# Patient Record
Sex: MALE | Race: WHITE | Employment: FULL TIME | URBAN - METROPOLITAN AREA
[De-identification: names, ages, dates, MRNs, and addresses within clinical notes are randomized per-mention and may not be internally consistent; named-entity substitution may affect disease eponyms.]

---

## 2017-01-03 ENCOUNTER — HOSPITAL ENCOUNTER (OUTPATIENT)
Dept: GENERAL RADIOLOGY | Age: 32
Discharge: HOME | End: 2017-01-03
Payer: COMMERCIAL

## 2017-01-03 DIAGNOSIS — M47.819 SPINAL ARTHRITIS DEFORMANS: ICD-10-CM

## 2017-01-03 DIAGNOSIS — Z79.899 ENCOUNTER FOR LONG-TERM (CURRENT) DRUG USE: ICD-10-CM

## 2017-01-03 PROCEDURE — 72110 X-RAY EXAM L-2 SPINE 4/>VWS: CPT

## 2017-01-03 PROCEDURE — 73560 X-RAY EXAM OF KNEE 1 OR 2: CPT

## 2017-01-03 PROCEDURE — 71020 XR CHEST PA LAT: CPT

## 2017-01-16 ENCOUNTER — HOSPITAL ENCOUNTER (EMERGENCY)
Age: 32
Discharge: HOME | End: 2017-01-16
Attending: EMERGENCY MEDICINE | Admitting: EMERGENCY MEDICINE
Payer: COMMERCIAL

## 2017-01-16 VITALS
DIASTOLIC BLOOD PRESSURE: 91 MMHG | TEMPERATURE: 97.8 F | OXYGEN SATURATION: 99 % | WEIGHT: 185 LBS | SYSTOLIC BLOOD PRESSURE: 138 MMHG | RESPIRATION RATE: 16 BRPM | BODY MASS INDEX: 25.06 KG/M2 | HEART RATE: 83 BPM | HEIGHT: 72 IN

## 2017-01-16 DIAGNOSIS — M54.42 CHRONIC LEFT-SIDED LOW BACK PAIN WITH LEFT-SIDED SCIATICA: Primary | ICD-10-CM

## 2017-01-16 DIAGNOSIS — G89.29 CHRONIC LEFT-SIDED LOW BACK PAIN WITH LEFT-SIDED SCIATICA: Primary | ICD-10-CM

## 2017-01-16 PROCEDURE — 99282 EMERGENCY DEPT VISIT SF MDM: CPT

## 2017-01-16 RX ORDER — OXYCODONE AND ACETAMINOPHEN 5; 325 MG/1; MG/1
1-2 TABLET ORAL
Qty: 20 TAB | Refills: 0 | Status: ON HOLD | OUTPATIENT
Start: 2017-01-16 | End: 2017-02-28

## 2017-01-16 RX ORDER — CYCLOBENZAPRINE HCL 5 MG
5 TABLET ORAL
Qty: 15 TAB | Refills: 0 | Status: SHIPPED | OUTPATIENT
Start: 2017-01-16

## 2017-01-16 RX ORDER — NAPROXEN 500 MG/1
500 TABLET ORAL 2 TIMES DAILY WITH MEALS
Status: ON HOLD | COMMUNITY
End: 2017-04-18

## 2017-01-17 NOTE — ED NOTES
I have reviewed discharge instructions with the patient. The patient verbalized understanding. Patient armband removed and shredded  Current Discharge Medication List      START taking these medications    Details   oxyCODONE-acetaminophen (PERCOCET) 5-325 mg per tablet Take 1-2 Tabs by mouth every six (6) hours as needed for Pain. Max Daily Amount: 8 Tabs. Qty: 20 Tab, Refills: 0      cyclobenzaprine (FLEXERIL) 5 mg tablet Take 1 Tab by mouth three (3) times daily as needed for Muscle Spasm(s). Qty: 15 Tab, Refills: 0         CONTINUE these medications which have NOT CHANGED    Details   naproxen (NAPROSYN) 500 mg tablet Take 500 mg by mouth two (2) times daily (with meals). CETIRIZINE HCL (ZYRTEC PO) Take  by mouth.

## 2017-01-17 NOTE — DISCHARGE INSTRUCTIONS
Learning About How to Have a Healthy Back  What causes back pain? Back pain is often caused by overuse, strain, or injury. For example, people often hurt their backs playing sports or working in the yard, being jolted in a car accident, or lifting something too heavy. Aging plays a part too. Your bones and muscles tend to lose strength as you age, which makes injury more likely. The spongy discs between the bones of the spine (vertebrae) may suffer from wear and tear and no longer provide enough cushion between the bones. A disc that bulges or breaks open (herniated disc) can press on nerves, causing back pain. In some people, back pain is the result of arthritis, broken vertebrae caused by bone loss (osteoporosis), illness, or a spine problem. Although most people have back pain at one time or another, there are steps you can take to make it less likely. How can you have a healthy back? Reduce stress on your back through good posture  Slumping or slouching alone may not cause low back pain. But after the back has been strained or injured, bad posture can make pain worse. · Sleep in a position that maintains your back's normal curves and on a mattress that feels comfortable. Sleep on your side with a pillow between your knees, or sleep on your back with a pillow under your knees. These positions can reduce strain on your back. · Stand and sit up straight. \"Good posture\" generally means your ears, shoulders, and hips are in a straight line. · If you must stand for a long time, put one foot on a stool, ledge, or box. Switch feet every now and then. · Sit in a chair that is low enough to let you place both feet flat on the floor with both knees nearly level with your hips. If your chair or desk is too high, use a footrest to raise your knees. Place a small pillow, a rolled-up towel, or a lumbar roll in the curve of your back if you need extra support.   · Try a kneeling chair, which helps tilt your hips forward. This takes pressure off your lower back. · Try sitting on an exercise ball. It can rock from side to side, which helps keep your back loose. · When driving, keep your knees nearly level with your hips. Sit straight, and drive with both hands on the steering wheel. Your arms should be in a slightly bent position. Reduce stress on your back through careful lifting  · Squat down, bending at the hips and knees only. If you need to, put one knee to the floor and extend your other knee in front of you, bent at a right angle (half kneeling). · Press your chest straight forward. This helps keep your upper back straight while keeping a slight arch in your low back. · Hold the load as close to your body as possible, at the level of your belly button (navel). · Use your feet to change direction, taking small steps. · Lead with your hips as you change direction. Keep your shoulders in line with your hips as you move. · Set down your load carefully, squatting with your knees and hips only. Exercise and stretch your back  · Do some exercise on most days of the week, if your doctor says it is okay. You can walk, run, swim, or cycle. · Stretch your back muscles. Here are a few exercises to try:  Aracely Annette on your back, and gently pull one bent knee to your chest. Put that foot back on the floor, and then pull the other knee to your chest.  ¨ Do pelvic tilts. Lie on your back with your knees bent. Tighten your stomach muscles. Pull your belly button (navel) in and up toward your ribs. You should feel like your back is pressing to the floor and your hips and pelvis are slightly lifting off the floor. Hold for 6 seconds while breathing smoothly. ¨ Sit with your back flat against a wall. · Keep your core muscles strong. The muscles of your back, belly (abdomen), and buttocks support your spine. ¨ Pull in your belly and imagine pulling your navel toward your spine. Hold this for 6 seconds, then relax.  Remember to keep breathing normally as you tense your muscles. ¨ Do curl-ups. Always do them with your knees bent. Keep your low back on the floor, and curl your shoulders toward your knees using a smooth, slow motion. Keep your arms folded across your chest. If this bothers your neck, try putting your hands behind your neck (not your head), with your elbows spread apart. ¨ Lie on your back with your knees bent and your feet flat on the floor. Tighten your belly muscles, and then push with your feet and raise your buttocks up a few inches. Hold this position 6 seconds as you continue to breathe normally, then lower yourself slowly to the floor. Repeat 8 to 12 times. ¨ If you like group exercise, try Pilates or yoga. These classes have poses that strengthen the core muscles. Lead a healthy lifestyle  · Stay at a healthy weight to avoid strain on your back. · Do not smoke. Smoking increases the risk of osteoporosis, which weakens the spine. If you need help quitting, talk to your doctor about stop-smoking programs and medicines. These can increase your chances of quitting for good. Where can you learn more? Go to http://viviana-leida.info/. Enter L315 in the search box to learn more about \"Learning About How to Have a Healthy Back. \"  Current as of: May 23, 2016  Content Version: 11.1  © 8531-6958 [employer]. Care instructions adapted under license by [employer] (which disclaims liability or warranty for this information). If you have questions about a medical condition or this instruction, always ask your healthcare professional. [place] [address] any warranty or liability for your use of this information.

## 2017-01-17 NOTE — ED PROVIDER NOTES
HPI Comments: Patient is a 31 y/o male who presents to the ER c/o lower back pain radiating to his left leg. Patient states he was diagnosed with ankylosing spondylitis and follows up with a rhuematologist.  They advised the pt to come to the ER if he has symptoms to receive an MRI. Patient has already been taking Naproxen 500mg q 12 hours and he just finished a 2 week steroid dose. Patient stated no change in his pain after the steroids. He denied any saddle anesthesia, bowel or bladder incontinence or new symptoms. States he just has difficulty sleeping due to the pain. Patient is a 32 y.o. male presenting with leg pain. The history is provided by the patient. Leg Pain    This is a chronic problem. Associated symptoms include back pain. Past Medical History:   Diagnosis Date    Asthma     Spondylitis [facility])        Past Surgical History:   Procedure Laterality Date    Hx tonsil and adenoidectomy           History reviewed. No pertinent family history. Social History     Social History    Marital status: SINGLE     Spouse name: N/A    Number of children: N/A    Years of education: N/A     Occupational History    Not on file. Social History Main Topics    Smoking status: Former Smoker    Smokeless tobacco: Not on file    Alcohol use Yes      Comment: 6-7 drinks per week    Drug use: No    Sexual activity: Not on file     Other Topics Concern    Not on file     Social History Narrative    No narrative on file         ALLERGIES: Review of patient's allergies indicates no known allergies. Review of Systems   Constitutional: Negative for chills, fatigue and fever. HENT: Negative. Negative for sore throat. Eyes: Negative. Respiratory: Negative for cough and shortness of breath. Cardiovascular: Negative for chest pain and palpitations. Gastrointestinal: Negative for abdominal pain, nausea and vomiting. Genitourinary: Negative for dysuria.    Musculoskeletal: Positive for back pain. Left leg pain   Skin: Negative. Neurological: Negative for dizziness, weakness, light-headedness and headaches. Psychiatric/Behavioral: Negative. All other systems reviewed and are negative. Vitals:    01/16/17 1959   BP: (!) 138/91   Pulse: 83   Resp: 16   Temp: 97.8 °F (36.6 °C)   SpO2: 99%   Weight: 83.9 kg (185 lb)   Height: 6' (1.829 m)            Physical Exam   Constitutional: He is oriented to person, place, and time. He appears well-developed and well-nourished. HENT:   Head: Normocephalic and atraumatic. Mouth/Throat: Oropharynx is clear and moist.   Eyes: Conjunctivae are normal. Pupils are equal, round, and reactive to light. No scleral icterus. Neck: Normal range of motion. Neck supple. No JVD present. No tracheal deviation present. Cardiovascular: Normal rate, regular rhythm and normal heart sounds. Pulmonary/Chest: Effort normal and breath sounds normal. No respiratory distress. He has no wheezes. Abdominal: Soft. Bowel sounds are normal.   Musculoskeletal:        Lumbar back: He exhibits tenderness. Back:    Pain in lower back that radiates to left posterior leg   Neurological: He is alert and oriented to person, place, and time. He has normal strength. Gait normal. GCS eye subscore is 4. GCS verbal subscore is 5. GCS motor subscore is 6. Walks with steady gait   Skin: Skin is warm and dry. Psychiatric: He has a normal mood and affect. Nursing note and vitals reviewed. MDM  Number of Diagnoses or Management Options  Chronic left-sided low back pain with left-sided sciatica:   Elevated blood pressure:   Diagnosis management comments: 8:51 PM  33 y/o male c/o chronic low back pain; states next appt is at end of February. Has tried steroids and naproxen with little relief. Advised unable to obtain MRI here in ER. Will need to obtain as outpatient. Will also give new ortho referral and try meds.   All questions answered and patient in agreement with plan of care. Will plan for discharge. Based on complaint and PE, no clinical indication for further imaging at this time.   Alexander Sloan PA-C    Clinical Impression:  Low back pain w/ sciatica    Risk of Complications, Morbidity, and/or Mortality  Presenting problems: low  Diagnostic procedures: low  Management options: low    Critical Care  Total time providing critical care: < 30 minutes    Patient Progress  Patient progress: stable    ED Course       Procedures

## 2017-01-17 NOTE — ED TRIAGE NOTES
C/O pain to left leg \"for months\".  Pt states that he was recently put on steroids by rheumatologist.

## 2017-01-25 ENCOUNTER — HOSPITAL ENCOUNTER (OUTPATIENT)
Age: 32
Discharge: HOME | End: 2017-01-25
Attending: INTERNAL MEDICINE
Payer: COMMERCIAL

## 2017-01-25 DIAGNOSIS — M54.50 LOW BACK PAIN: ICD-10-CM

## 2017-01-25 PROCEDURE — 72148 MRI LUMBAR SPINE W/O DYE: CPT

## 2017-02-15 ENCOUNTER — OFFICE VISIT (OUTPATIENT)
Dept: ORTHOPEDIC SURGERY | Age: 32
End: 2017-02-15

## 2017-02-15 VITALS
OXYGEN SATURATION: 98 % | RESPIRATION RATE: 18 BRPM | SYSTOLIC BLOOD PRESSURE: 124 MMHG | HEART RATE: 82 BPM | TEMPERATURE: 98.4 F | DIASTOLIC BLOOD PRESSURE: 68 MMHG | BODY MASS INDEX: 24.38 KG/M2 | WEIGHT: 180 LBS | HEIGHT: 72 IN

## 2017-02-15 DIAGNOSIS — M51.26 LUMBAR HERNIATED DISC: ICD-10-CM

## 2017-02-15 DIAGNOSIS — M54.16 LUMBAR RADICULOPATHY: Primary | ICD-10-CM

## 2017-02-15 NOTE — PROGRESS NOTES
[address]  [address], [address]  [place], [address] [place] [address] Street  Phone: [phone]  Fax: [phone]        Walter Vega  : 1985  PCP: Milana Enriquez MD  2/15/2017    NEW PATIENT      ASSESSMENT AND PLAN     Amalia Ruelas comes in to the office today c/o 6-10/10 aching lumbar pain that radiates down the posterior left leg to the top of his foot x 4 months. He has had a lumbar MRI which shows a 1.0 cm posterior L5-S1 disc protrusion with abutment of the bilateral S1 nerve roots and potentially other nerve roots. His pain is likely due to a left L5 and S1 radiculopathy. He was referred for left L5 and S1 SNRBs. 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 Pt will f/u in 3 weeks or prn. Heydi Gonsalves was seen today for back pain, leg pain and hip pain. Diagnoses and all orders for this visit:    Lumbar radiculopathy  -     SCHEDULE SURGERY  -     Generic Supply Order    Lumbar herniated disc  -     SCHEDULE SURGERY  -     Generic Supply Order         Follow-up Disposition:  Return in about 3 weeks (around 3/8/2017), or if symptoms worsen or fail to improve. CHIEF COMPLAINT  Amalia Ruelas is seen today in consultation at the request of Milana Enriquez MD for complaints of lumbar pain. HISTORY OF PRESENT ILLNESS  Amalia Ruelas is a 32 y.o. male c/o 6-10/10 aching lumbar pain that radiates down the posterior left leg to the top of his foot x 4 months. He does not recall an incident which could have caused his pain. He has taken Naproxen, Tylenol, Ibuprofen, Gabapentin, Endomethein, and Oxycodone for his pain. Sitting exacerbates his pain. Lying down relieves his pain. Pt denies any fevers, chills, nausea, vomiting. Pt denies any chest pain and shortness of breath. Pt denies any ear, nose, and throat problems. Pt denies any fecal or urinary incontinence. He works in heavy construction.       PAST MEDICAL HISTORY   Past Medical History   Diagnosis Date    Asthma     Spondylitis [facility])        Past Surgical History   Procedure Laterality Date    Hx tonsil and adenoidectomy         MEDICATIONS      Current Outpatient Prescriptions   Medication Sig Dispense Refill    CETIRIZINE HCL (ZYRTEC PO) Take  by mouth.  cyclobenzaprine (FLEXERIL) 5 mg tablet Take 1 Tab by mouth three (3) times daily as needed for Muscle Spasm(s). 15 Tab 0    naproxen (NAPROSYN) 500 mg tablet Take 500 mg by mouth two (2) times daily (with meals).  oxyCODONE-acetaminophen (PERCOCET) 5-325 mg per tablet Take 1-2 Tabs by mouth every six (6) hours as needed for Pain. Max Daily Amount: 8 Tabs. 20 Tab 0       ALLERGIES  No Known Allergies       SOCIAL HISTORY    Social History     Social History    Marital status: SINGLE     Spouse name: N/A    Number of children: N/A    Years of education: N/A     Social History Main Topics    Smoking status: Former Smoker    Smokeless tobacco: None    Alcohol use Yes      Comment: 6-7 drinks per week    Drug use: No    Sexual activity: Not Asked     Other Topics Concern    None     Social History Narrative       FAMILY HISTORY  No family history on file. REVIEW OF SYSTEMS  Review of Systems   Constitutional: Negative for chills, diaphoresis, fever, malaise/fatigue and weight loss. Respiratory: Negative for shortness of breath. Cardiovascular: Negative for chest pain and leg swelling. Gastrointestinal: Negative for constipation, nausea and vomiting. Neurological: Negative for dizziness, tingling, seizures, loss of consciousness and headaches. Psychiatric/Behavioral: The patient does not have insomnia. PHYSICAL EXAMINATION  Visit Vitals    /68    Pulse 82    Temp 98.4 °F (36.9 °C) (Oral)    Resp 18    Ht 6' (1.829 m)    Wt 180 lb (81.6 kg)    SpO2 98%    BMI 24.41 kg/m2         No flowsheet data found.       Constitutional:  Well developed, well nourished, in no acute distress. Psychiatric: Affect and mood are appropriate. HEENT: Normocephalic, atraumatic. Extraocular movements intact. Integumentary: No rashes or abrasions noted on exposed areas. Cardiovascular: Regular rate and rhythm. Pulmonary: Clear to auscultation bilaterally. SPINE/MUSCULOSKELETAL EXAM    Cervical spine:  Neck is midline. Normal muscle tone. No focal atrophy is noted. ROM pain free. Shoulder ROM intact. No tenderness to palpation. Negative Spurling's sign. Negative Tinel's sign. Negative Stanley's sign. Sensation in the bilateral arms grossly intact to light touch. Lumbar spine:  No rash, ecchymosis, or gross obliquity. No fasciculations. No focal atrophy is noted. No pain with hip ROM. Full range of motion. No tenderness to palpation. No tenderness to palpation at the sciatic notch. SI joints non-tender. Trochanters non tender. No pain with back flexion or extension. Decreased sensation in left L5 dermatome. MOTOR:      Biceps  Triceps Deltoids Wrist Ext Wrist Flex Hand Intrin   Right 5/5 5/5 5/5 5/5 5/5 5/5   Left 5/5 5/5 5/5 5/5 5/5 5/5             Hip Flex  Quads Hamstrings Ankle DF EHL Ankle PF   Right 5/5 5/5 5/5 5/5 5/5 5/5   Left 5/5 5/5 5/5 5/5 5/5 5/5     DTRs are 2+ biceps, triceps, brachioradialis, patella, and Achilles. Positive left straight Leg raise. Squat not tested. No difficulty with tandem gait. Ambulation without assistive device. FWB. RADIOGRAPHS  Lumbar MRI images taken on 1/25/2017 personally reviewed with patient:  FINDINGS:     General: Vertebral body heights preserved. L5-S1 mild disc space loss. Elsewhere  disc space heights preserved. No focal listhesis. Mild straightening of the  normal lordosis. Conus ends at L1 level. No abnormal signal in the distal cord. No abnormal epidural collection identified.  No focal suspicious marrow lesions.     Incidental: Surrounding soft tissues unremarkable.     Levels:     T11-T12: Evaluated sagittal plane only. No significant degenerative change or  stenosis.     T12-L1: No significant degenerative changes or stenosis.     L1-L2: Minimal disc bulge. Facets unremarkable. No stenosis.     L2-L3: Minimal disc bulge and small focal left foraminal protrusion. Facets  unremarkable. Spinal canal patent. Mild left foraminal stenosis.     L3-L4: No significant disc disease. Mild facet arthropathy. Spinal canal patent. Mild bilateral foraminal stenosis.     L4-L5: Minimal disc bulge. Mild facet arthropathy. Spinal canal patent. Mild  bilateral foraminal stenosis.     L5-S1: Focal central disc protrusion extruding roughly 1.0 cm posteriorly, mild  facet arthropathy. Abutment of descending bilateral S1 nerve roots and possibly  additional descending nerve roots, with overall mild spinal canal stenosis. Moderate bilateral foraminal stenosis.     Imaged sacrum: Unremarkable.     IMPRESSION  IMPRESSION:        1. At L5-S1 there is pronounced focal central disc protrusion abutting bilateral  descending S1 nerve roots and possibly additional descending nerve roots, with  overall mild spinal canal stenosis. Moderate bilateral foraminal stenosis also.     -Minimal degenerative changes at other levels, causing no more than mild  foraminal stenosis.  reviewed    Mr. Tammy Sánchez has a reminder for a \"due or due soon\" health maintenance. I have asked that he contact his primary care provider for follow-up on this health maintenance. This plan was reviewed with the patient and patient agrees. All questions were answered. More than half of this visit today was spent on counseling. Written by Cat Azevedo, as dictated by Dr. Kobe Malcolm. I, Dr. Kobe Malcolm, confirm that all documentation is accurate.

## 2017-02-15 NOTE — PATIENT INSTRUCTIONS
Learning About Lumbar Epidural Steroid Injections  What is a lumbar epidural steroid injection? A doctor may give you a lumbar epidural steroid injection to try to decrease pain, tingling, or numbness in your back, buttock, or leg. These might be the result of a back or disc problem. The injection goes directly into your epidural space. This is the area in your back around your spinal cord. This injection may have both a local anesthetic and a steroid medicine. Or it may only have a steroid. Local anesthetic medicines numb your nerves right away for a short time. Steroids reduce swelling and pain. But they take a few days to start working. Some people get a series of these injections over weeks or months. How is a lumbar epidural done? The doctor may use an imaging test before or during your injection. This can be an MRI, a CT scan, or an X-ray. These tests can show where your nerve problems are. After finding the right spot, the doctor may inject a numbing medicine into the skin where you will get the steroid injection. Then he or she puts the needle for the steroid into the numbed area. You may feel some pressure. You could feel some stinging or burning during the injection. It takes about 10 to 15 minutes to get this injection. You will probably go home about 20 to 30 minutes after you get it. You will need someone to drive you home. What can you expect after a lumbar epidural?  If your injection had local anesthetic and a steroid, your legs may feel heavy or numb right after. You will probably be able to walk. But you may need to be extra careful. Take care not to lose your balance and be sure to follow your doctor's instructions. If your injection contained local anesthetic, you may feel better right away. But this pain relief will last only a few hours. Your pain will probably return. This is because the steroids have not started working yet.  Before the steroids start to work, your back may be sore for a few days. These injections don't always work. When they do, it takes 1 to 5 days. This pain relief can last for several days to a few months or longer. You may want to do less than normal for a few days. But you may also be able to return to your daily routine. Some people are dizzy or feel sick to their stomach after getting this injection. These symptoms usually do not last very long. If your pain is better, you may be able to keep doing your normal activities or physical therapy. But try not to overdo it, even if your back pain has improved a lot. If your pain is only a little better or if it comes back, your doctor may recommend another injection in a few weeks. If your pain has not changed, talk to your doctor about other treatment choices. Side effects from an epidural steroid injection include headache, fever, or infection. Serious side effects are rare. But they can include stroke, paralysis, or loss of vision. Follow-up care is a key part of your treatment and safety. Be sure to make and go to all appointments, and call your doctor if you are having problems. It's also a good idea to know your test results and keep a list of the medicines you take. Where can you learn more? Go to http://viviana-leida.info/. Enter Les Sunshine in the search box to learn more about \"Learning About Lumbar Epidural Steroid Injections. \"  Current as of: May 23, 2016  Content Version: 11.1  © 7649-0242 [employer] Care instructions adapted under license by [employer] (which disclaims liability or warranty for this information). If you have questions about a medical condition or this instruction, always ask your healthcare professional. [address] any warranty or liability for your use of this information.

## 2017-02-23 RX ORDER — GABAPENTIN 100 MG/1
100 CAPSULE ORAL 3 TIMES DAILY
Qty: 90 CAP | Refills: 0 | Status: SHIPPED | OUTPATIENT
Start: 2017-02-23 | End: 2017-02-23 | Stop reason: SDUPTHER

## 2017-02-23 RX ORDER — GABAPENTIN 100 MG/1
100 CAPSULE ORAL 3 TIMES DAILY
Qty: 90 CAP | Refills: 0 | Status: SHIPPED | OUTPATIENT
Start: 2017-02-23 | End: 2017-02-27 | Stop reason: SDUPTHER

## 2017-02-23 NOTE — TELEPHONE ENCOUNTER
I left a message for the patient to call back me back because I needed to speak to Dr. Casper Has because patient was asking for Neurontin 300mg and not 100mg. Neurontin 100mg was sent to [facility] and it is on hold until I speak to the patient to see if he wants the 100mg or 300mg. Dr Mosley Service states he is fine with the patinet taking 300mg Tid but to increase 1 pill a week.  As soon as I speak to the patient I will then call Davina back at [facility].

## 2017-02-23 NOTE — TELEPHONE ENCOUNTER
Order pending as previously prescription. Prescription information received from [facility]. Requested Prescriptions     Pending Prescriptions Disp Refills    gabapentin (NEURONTIN) 100 mg capsule 90 Cap 0     Sig: Take 1 Cap by mouth three (3) times daily.

## 2017-02-23 NOTE — TELEPHONE ENCOUNTER
PATIENT CALLED AND IS ASKING FOR GABAPENTIN 300 MG  MEDICATION FROM DR. WU. [facility] TEL. [phone]. PATIENT TEL. [phone] ( PLEASE LEAVE HIM A MESSAGE DUE TO ALL CALLS GO STRAIGHT TO HIS VOICE MAIL).

## 2017-02-23 NOTE — TELEPHONE ENCOUNTER
The patient requests a prescription for Neurontin 300 mg to be e-scribed to [facility]. Please advise.      Last Visit: 02/15/2017 with MD Sameer Dykes    Next Appointment: 03/24/2017 with MD Sameer Dykes

## 2017-02-27 RX ORDER — GABAPENTIN 300 MG/1
300 CAPSULE ORAL 3 TIMES DAILY
Qty: 90 CAP | Refills: 0 | OUTPATIENT
Start: 2017-02-27 | End: 2017-03-24 | Stop reason: ALTCHOICE

## 2017-02-27 NOTE — TELEPHONE ENCOUNTER
Spoke with patient, patient has increased himself to 300mg TID as previously discussed at prior visit. Med phoned into pharmacy on file, spoke with Pharmacist Baylee Rehman, aure Todd. Patient aware. No further action required at this time.

## 2017-02-28 ENCOUNTER — HOSPITAL ENCOUNTER (OUTPATIENT)
Age: 32
Setting detail: OUTPATIENT SURGERY
Discharge: HOME | End: 2017-02-28
Attending: PHYSICAL MEDICINE & REHABILITATION | Admitting: PHYSICAL MEDICINE & REHABILITATION
Payer: COMMERCIAL

## 2017-02-28 ENCOUNTER — SURGERY (OUTPATIENT)
Age: 32
End: 2017-02-28

## 2017-02-28 ENCOUNTER — APPOINTMENT (OUTPATIENT)
Dept: GENERAL RADIOLOGY | Age: 32
End: 2017-02-28
Attending: PHYSICAL MEDICINE & REHABILITATION
Payer: COMMERCIAL

## 2017-02-28 VITALS
HEIGHT: 72 IN | BODY MASS INDEX: 24.38 KG/M2 | DIASTOLIC BLOOD PRESSURE: 76 MMHG | WEIGHT: 180 LBS | RESPIRATION RATE: 18 BRPM | HEART RATE: 82 BPM | TEMPERATURE: 98 F | SYSTOLIC BLOOD PRESSURE: 128 MMHG | OXYGEN SATURATION: 99 %

## 2017-02-28 PROCEDURE — 74011250636 HC RX REV CODE- 250/636: Performed by: PHYSICAL MEDICINE & REHABILITATION

## 2017-02-28 PROCEDURE — 74011636320 HC RX REV CODE- 636/320: Performed by: PHYSICAL MEDICINE & REHABILITATION

## 2017-02-28 PROCEDURE — 77030003669 HC NDL SPN COOK -B: Performed by: PHYSICAL MEDICINE & REHABILITATION

## 2017-02-28 PROCEDURE — 77030003672 HC NDL SPN HALY -A: Performed by: PHYSICAL MEDICINE & REHABILITATION

## 2017-02-28 PROCEDURE — 74011250637 HC RX REV CODE- 250/637: Performed by: PHYSICAL MEDICINE & REHABILITATION

## 2017-02-28 PROCEDURE — 74011000250 HC RX REV CODE- 250: Performed by: PHYSICAL MEDICINE & REHABILITATION

## 2017-02-28 PROCEDURE — 76010000009 HC PAIN MGT 0 TO 30 MIN PROC: Performed by: PHYSICAL MEDICINE & REHABILITATION

## 2017-02-28 RX ORDER — DIAZEPAM 5 MG/1
2.5-1 TABLET ORAL ONCE
Status: COMPLETED | OUTPATIENT
Start: 2017-02-28 | End: 2017-02-28

## 2017-02-28 RX ORDER — DEXAMETHASONE SODIUM PHOSPHATE 100 MG/10ML
INJECTION INTRAMUSCULAR; INTRAVENOUS AS NEEDED
Status: DISCONTINUED | OUTPATIENT
Start: 2017-02-28 | End: 2017-02-28 | Stop reason: HOSPADM

## 2017-02-28 RX ORDER — LIDOCAINE HYDROCHLORIDE 10 MG/ML
INJECTION, SOLUTION EPIDURAL; INFILTRATION; INTRACAUDAL; PERINEURAL AS NEEDED
Status: DISCONTINUED | OUTPATIENT
Start: 2017-02-28 | End: 2017-02-28 | Stop reason: HOSPADM

## 2017-02-28 RX ORDER — INDOMETHACIN 25 MG/1
50 CAPSULE ORAL 3 TIMES DAILY
COMMUNITY

## 2017-02-28 RX ADMIN — IOPAMIDOL 1 ML: 408 INJECTION, SOLUTION INTRATHECAL at 13:52

## 2017-02-28 RX ADMIN — DEXAMETHASONE SODIUM PHOSPHATE 30 MG: 10 INJECTION INTRAMUSCULAR; INTRAVENOUS at 13:52

## 2017-02-28 RX ADMIN — LIDOCAINE HYDROCHLORIDE 8 ML: 10 INJECTION, SOLUTION EPIDURAL; INFILTRATION; INTRACAUDAL; PERINEURAL at 13:52

## 2017-02-28 RX ADMIN — DIAZEPAM 10 MG: 5 TABLET ORAL at 13:36

## 2017-02-28 NOTE — INTERVAL H&P NOTE
H&P Update:  Dank Morataya was seen and briefly examined. History and physical has been reviewed.   There have been no significant clinical changes since the completion of the originally dated History and Physical.    Signed By: Balwinder Doe MD     February 28, 2017 1:48 PM

## 2017-02-28 NOTE — H&P (VIEW-ONLY)
[address]  [address], [address]  [place], [address] [place] [address] Street  Phone: [phone]  Fax: [phone]        Caren Tejeda  : 1985  PCP: Joshua Jesus MD  2/15/2017    NEW PATIENT      ASSESSMENT AND PLAN     Matti Olivarez comes in to the office today c/o 6-10/10 aching lumbar pain that radiates down the posterior left leg to the top of his foot x 4 months. He has had a lumbar MRI which shows a 1.0 cm posterior L5-S1 disc protrusion with abutment of the bilateral S1 nerve roots and potentially other nerve roots. His pain is likely due to a left L5 and S1 radiculopathy. He was referred for left L5 and S1 SNRBs. 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 Pt will f/u in 3 weeks or prn. Vincent Hathaway was seen today for back pain, leg pain and hip pain. Diagnoses and all orders for this visit:    Lumbar radiculopathy  -     SCHEDULE SURGERY  -     Generic Supply Order    Lumbar herniated disc  -     SCHEDULE SURGERY  -     Generic Supply Order         Follow-up Disposition:  Return in about 3 weeks (around 3/8/2017), or if symptoms worsen or fail to improve. CHIEF COMPLAINT  Matti Olivarez is seen today in consultation at the request of Joshua Jesus MD for complaints of lumbar pain. HISTORY OF PRESENT ILLNESS  Matti Olivarez is a 32 y.o. male c/o 6-10/10 aching lumbar pain that radiates down the posterior left leg to the top of his foot x 4 months. He does not recall an incident which could have caused his pain. He has taken Naproxen, Tylenol, Ibuprofen, Gabapentin, Endomethein, and Oxycodone for his pain. Sitting exacerbates his pain. Lying down relieves his pain. Pt denies any fevers, chills, nausea, vomiting. Pt denies any chest pain and shortness of breath. Pt denies any ear, nose, and throat problems. Pt denies any fecal or urinary incontinence. He works in heavy construction.       PAST MEDICAL HISTORY   Past Medical History   Diagnosis Date    Asthma     Spondylitis [facility])        Past Surgical History   Procedure Laterality Date    Hx tonsil and adenoidectomy         MEDICATIONS      Current Outpatient Prescriptions   Medication Sig Dispense Refill    CETIRIZINE HCL (ZYRTEC PO) Take  by mouth.  cyclobenzaprine (FLEXERIL) 5 mg tablet Take 1 Tab by mouth three (3) times daily as needed for Muscle Spasm(s). 15 Tab 0    naproxen (NAPROSYN) 500 mg tablet Take 500 mg by mouth two (2) times daily (with meals).  oxyCODONE-acetaminophen (PERCOCET) 5-325 mg per tablet Take 1-2 Tabs by mouth every six (6) hours as needed for Pain. Max Daily Amount: 8 Tabs. 20 Tab 0       ALLERGIES  No Known Allergies       SOCIAL HISTORY    Social History     Social History    Marital status: SINGLE     Spouse name: N/A    Number of children: N/A    Years of education: N/A     Social History Main Topics    Smoking status: Former Smoker    Smokeless tobacco: None    Alcohol use Yes      Comment: 6-7 drinks per week    Drug use: No    Sexual activity: Not Asked     Other Topics Concern    None     Social History Narrative       FAMILY HISTORY  No family history on file. REVIEW OF SYSTEMS  Review of Systems   Constitutional: Negative for chills, diaphoresis, fever, malaise/fatigue and weight loss. Respiratory: Negative for shortness of breath. Cardiovascular: Negative for chest pain and leg swelling. Gastrointestinal: Negative for constipation, nausea and vomiting. Neurological: Negative for dizziness, tingling, seizures, loss of consciousness and headaches. Psychiatric/Behavioral: The patient does not have insomnia. PHYSICAL EXAMINATION  Visit Vitals    /68    Pulse 82    Temp 98.4 °F (36.9 °C) (Oral)    Resp 18    Ht 6' (1.829 m)    Wt 180 lb (81.6 kg)    SpO2 98%    BMI 24.41 kg/m2         No flowsheet data found.       Constitutional:  Well developed, well nourished, in no acute distress. Psychiatric: Affect and mood are appropriate. HEENT: Normocephalic, atraumatic. Extraocular movements intact. Integumentary: No rashes or abrasions noted on exposed areas. Cardiovascular: Regular rate and rhythm. Pulmonary: Clear to auscultation bilaterally. SPINE/MUSCULOSKELETAL EXAM    Cervical spine:  Neck is midline. Normal muscle tone. No focal atrophy is noted. ROM pain free. Shoulder ROM intact. No tenderness to palpation. Negative Spurling's sign. Negative Tinel's sign. Negative Stanley's sign. Sensation in the bilateral arms grossly intact to light touch. Lumbar spine:  No rash, ecchymosis, or gross obliquity. No fasciculations. No focal atrophy is noted. No pain with hip ROM. Full range of motion. No tenderness to palpation. No tenderness to palpation at the sciatic notch. SI joints non-tender. Trochanters non tender. No pain with back flexion or extension. Decreased sensation in left L5 dermatome. MOTOR:      Biceps  Triceps Deltoids Wrist Ext Wrist Flex Hand Intrin   Right 5/5 5/5 5/5 5/5 5/5 5/5   Left 5/5 5/5 5/5 5/5 5/5 5/5             Hip Flex  Quads Hamstrings Ankle DF EHL Ankle PF   Right 5/5 5/5 5/5 5/5 5/5 5/5   Left 5/5 5/5 5/5 5/5 5/5 5/5     DTRs are 2+ biceps, triceps, brachioradialis, patella, and Achilles. Positive left straight Leg raise. Squat not tested. No difficulty with tandem gait. Ambulation without assistive device. FWB. RADIOGRAPHS  Lumbar MRI images taken on 1/25/2017 personally reviewed with patient:  FINDINGS:     General: Vertebral body heights preserved. L5-S1 mild disc space loss. Elsewhere  disc space heights preserved. No focal listhesis. Mild straightening of the  normal lordosis. Conus ends at L1 level. No abnormal signal in the distal cord. No abnormal epidural collection identified.  No focal suspicious marrow lesions.     Incidental: Surrounding soft tissues unremarkable.     Levels:     T11-T12: Evaluated sagittal plane only. No significant degenerative change or  stenosis.     T12-L1: No significant degenerative changes or stenosis.     L1-L2: Minimal disc bulge. Facets unremarkable. No stenosis.     L2-L3: Minimal disc bulge and small focal left foraminal protrusion. Facets  unremarkable. Spinal canal patent. Mild left foraminal stenosis.     L3-L4: No significant disc disease. Mild facet arthropathy. Spinal canal patent. Mild bilateral foraminal stenosis.     L4-L5: Minimal disc bulge. Mild facet arthropathy. Spinal canal patent. Mild  bilateral foraminal stenosis.     L5-S1: Focal central disc protrusion extruding roughly 1.0 cm posteriorly, mild  facet arthropathy. Abutment of descending bilateral S1 nerve roots and possibly  additional descending nerve roots, with overall mild spinal canal stenosis. Moderate bilateral foraminal stenosis.     Imaged sacrum: Unremarkable.     IMPRESSION  IMPRESSION:        1. At L5-S1 there is pronounced focal central disc protrusion abutting bilateral  descending S1 nerve roots and possibly additional descending nerve roots, with  overall mild spinal canal stenosis. Moderate bilateral foraminal stenosis also.     -Minimal degenerative changes at other levels, causing no more than mild  foraminal stenosis.  reviewed    Mr. Issac Singleton has a reminder for a \"due or due soon\" health maintenance. I have asked that he contact his primary care provider for follow-up on this health maintenance. This plan was reviewed with the patient and patient agrees. All questions were answered. More than half of this visit today was spent on counseling. Written by Tracy Bravo, as dictated by Dr. Sid Mir. I, Dr. Sid Mir, confirm that all documentation is accurate.

## 2017-02-28 NOTE — DISCHARGE INSTRUCTIONS
[facility]   ([facility])  Dr. Godwin Ruiz, Dr. Lisa Washington, Dr. Jamey Dance not drive a car, operate heavy machinery or dangerous equipment for 24 hours. * Activity as tolerated; rest for the remainder of the day. * Resume pre-block medications including those for your family doctor. * Do not drink alcoholic beverages for 24 hours. Alcohol and the medications you have received may interact and cause an adverse reaction. * You may feel better this evening and worse tomorrow, as the numbing medications wears off and the steroid has yet to begin to work. After 48 hrs the steroid should begin to release bringing you relief. * You may shower this evening and remove any bandages. * Avoid hot tubs and heating pads for 24 hours. You may use cold packs on the procedure site as tolerated for the first 24 hours. * If a headache develops, drink plenty of fluids and rest.  Take over the counter medications for headache if needed. If the headache continues longer than 24 hours, call MD at the [address] [place] [address]. [phone]    * Continue taking pain medications as needed. * You may resume your regular diet if tolerated. Otherwise, start with sips of water and advance slowly. * If Diabetic: check your blood sugar three times a day for the next 3 days. If your sugar is greater than 300 call your family doctor. If your sugar is greater than 400, have someone transport you to the nearest Emergency Room. * If you experience any of the following problems, Please Call the [address] [place] [address] at [phone].         * Shortness of Breath    * Fever of 101 or higher    * Nausea / Vomiting    * Severe Headache    * Weakness or numbness in arms or legs that is not      resolving    * Prolonged increase in pain greater than 4 days      DISCHARGE SUMMARY from Nurse      PATIENT INSTRUCTIONS:    After oral sedation, for 24 hours or while taking prescription Narcotics:  · Limit your activities  · Do not drive and operate hazardous machinery  · Do not make important personal or business decisions  · Do  not drink alcoholic beverages  · If you have not urinated within 8 hours after discharge, please contact your surgeon on call. Report the following to your surgeon:  · Excessive pain, swelling, redness or odor of or around the surgical area  · Temperature over 101  · Nausea and vomiting lasting longer than 4 hours or if unable to take medications  · Any signs of decreased circulation or nerve impairment to extremity: change in color, persistent  numbness, tingling, coldness or increase pain  · Any questions            What to do at Home:  Recommended activity: Activity as tolerated, NO DRIVING FOR 12 Hours post injection          *  Please give a list of your current medications to your Primary Care Provider. *  Please update this list whenever your medications are discontinued, doses are      changed, or new medications (including over-the-counter products) are added. *  Please carry medication information at all times in case of emergency situations. These are general instructions for a healthy lifestyle:    No smoking/ No tobacco products/ Avoid exposure to second hand smoke    Surgeon General's Warning:  Quitting smoking now greatly reduces serious risk to your health. Obesity, smoking, and sedentary lifestyle greatly increases your risk for illness    A healthy diet, regular physical exercise & weight monitoring are important for maintaining a healthy lifestyle    You may be retaining fluid if you have a history of heart failure or if you experience any of the following symptoms:  Weight gain of 3 pounds or more overnight or 5 pounds in a week, increased swelling in our hands or feet or shortness of breath while lying flat in bed.   Please call your doctor as soon as you notice any of these symptoms; do not wait until your next office visit. Recognize signs and symptoms of STROKE:    F-face looks uneven    A-arms unable to move or move unevenly    S-speech slurred or non-existent    T-time-call 911 as soon as signs and symptoms begin-DO NOT go       Back to bed or wait to see if you get better-TIME IS BRAIN.

## 2017-02-28 NOTE — PROCEDURES
PROCEDURE NOTE      Patient Name: Dianna Sibley    Date of Procedure: February 28, 2017    Preoperative Diagnosis:  Acute left lumbar radiculopathy [M54.16]  Bulge of lumbar disc without myelopathy [M51.26]    Post Operative Diagnosis:  Acute left lumbar radiculopathy [M54.16]  Bulge of lumbar disc without myelopathy [M51.26]    Procedure :    left L5 Selective Nerve Root Block  left S1 Selective Nerve Root Block    Consent:  Informed consent was obtained prior to the procedure. The patient was given the opportunity to ask questions regarding the procedure and its associated risks. In addition to the potential risks associated with the procedure itself, the patient was informed both verbally and in writing of the potential side effects of the use of glucocorticoid. The patient appeared to comprehend the informed consent and desired to have the procedure performed. Procedure: The patient was placed in the prone position on the fluoroscopy table and the back was prepped and draped in the usual sterile manner. The exact spinal level was  identified using fluoroscopy, and Lidocaine 1 % was injected locally, a # 22 gauge spinal needle was passed to the transverse process. The depth was noted and the needle redirected to pass inferior and approximately one cm anterior to the transverse process. YES  1 cc of Isovue M-200 was used to verify positioning in the epidural and paravertebral space and outlined the course of the spinal nerve into the epidural space. The same procedure was repeated at each spinal level indicated above. No vascular uptake was identified. A total of 30 mg of preservative free Dexamethasone and 2 cc of Lidocaine was slowly injected. The patient tolerated the procedure well. The injection area was cleaned and bandaids applied. Not excessive bleeding was noted. Patient dressed and discharged to home with instructions.     Discussion: The patient tolerated the procedure donna.                                              Nimco Rodriguez MD  February 28, 2017

## 2017-03-24 ENCOUNTER — OFFICE VISIT (OUTPATIENT)
Dept: ORTHOPEDIC SURGERY | Age: 32
End: 2017-03-24

## 2017-03-24 VITALS
DIASTOLIC BLOOD PRESSURE: 67 MMHG | HEART RATE: 85 BPM | OXYGEN SATURATION: 98 % | HEIGHT: 72 IN | TEMPERATURE: 98.2 F | BODY MASS INDEX: 24.38 KG/M2 | WEIGHT: 180 LBS | RESPIRATION RATE: 20 BRPM | SYSTOLIC BLOOD PRESSURE: 123 MMHG

## 2017-03-24 DIAGNOSIS — M54.16 LUMBAR RADICULOPATHY: Primary | ICD-10-CM

## 2017-03-24 RX ORDER — GABAPENTIN 300 MG/1
600 CAPSULE ORAL 3 TIMES DAILY
Qty: 180 CAP | Refills: 2 | Status: SHIPPED | OUTPATIENT
Start: 2017-03-24

## 2017-03-24 NOTE — PATIENT INSTRUCTIONS
Gabapentin (Neurontin) instructions: We will increase your current dose from 300 mg (1 pill) three times a day to 600 mg (2 pills) three times a day. Morning Afternoon Night   Week 1 1 pill 1 pill 2 pills   Week 2 2 pills 1 pill 2 pills   Week 3 and onwards 2 pills 2 pills 2 pills       Week 1: Take an additional 300 mg pill at night to your current dose so that you are taking 1 pill in the morning, 1 pill in the afternoon, and 2 pills at night. Week 2: Take an additional 300 mg pill in the morning so that you are taking 2 pills in the morning, 1 pill in the afternoon, and 2 pills at night. Week 3 and onwards: Take an additional 300 mg pill in the afternoon so that you are taking 2 pills in the morning, 2 pills in the afternoon, and 2 pills at night. Continue taking this dose each day.

## 2017-03-24 NOTE — PROGRESS NOTES
[address]  [address], [address]  [place]  Phone: [phone]  Fax: [phone]        Dona Pelaez  : 1985  PCP: Margaret Dalal MD  3/24/2017    PROGRESS NOTE      ASSESSMENT AND PLAN    Lizandro Kolb comes in to the office today for a left L5 and S1 SNRB f/u. He found great relief with the injections, particularly with his ability to sleep through the night. However, he is continuing to experience the same posterior left leg pain the ends at the top of his foot. He was referred for a second set of left L5 and S1 SNRBs. Pt was also interested in beginning PT so he was referred for that as well. He was prescribed an increased dose of Gabapentin (2x 300 mg TID from 300 mg TID). Pt will f/u in 3 weeks or prn. Roland Trent was seen today for back pain. Diagnoses and all orders for this visit:    Lumbar radiculopathy  -     SCHEDULE SURGERY  -     REFERRAL TO PHYSICAL THERAPY  -     gabapentin (NEURONTIN) 300 mg capsule; Take 2 Caps by mouth three (3) times daily. Follow-up Disposition:  Return in about 3 weeks (around 2017), or if symptoms worsen or fail to improve. HISTORY OF PRESENT ILLNESS  Lizandro Kolb is a 32 y.o. male. A&P / HPI from 2/15/2017:  Lizandro Kolb comes in to the office today c/o 6-10/10 aching lumbar pain that radiates down the posterior left leg to the top of his foot x 4 months. He has had a lumbar MRI which shows a 1.0 cm posterior L5-S1 disc protrusion with abutment of the bilateral S1 nerve roots and potentially other nerve roots. His pain is likely due to a left L5 and S1 radiculopathy.     He was referred for left L5 and S1 SNRBs. 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     Pt will f/u in 3 weeks or prn. Updates from 17:  Pt presents for a left L5 and S1 SNRB f/u.  He found great relief with the injections, particularly with his ability to sleep through the night. However, he is continuing to experience the same posterior left leg pain the ends at the top of his foot. Pt is currently taking Gabapentin 300 mg TID. PAST MEDICAL HISTORY   Past Medical History:   Diagnosis Date    Asthma     Spondylitis [facility])        Past Surgical History:   Procedure Laterality Date    HX TONSIL AND ADENOIDECTOMY      NEUROLOGICAL PROCEDURE UNLISTED  02/28/2017    block   . MEDICATIONS      Current Outpatient Prescriptions   Medication Sig Dispense Refill    indomethacin (INDOCIN) 25 mg capsule Take 50 mg by mouth three (3) times daily. Pt unsure of dose.  gabapentin (NEURONTIN) 300 mg capsule Take 1 Cap by mouth three (3) times daily. 90 Cap 0    naproxen (NAPROSYN) 500 mg tablet Take 500 mg by mouth two (2) times daily (with meals).  CETIRIZINE HCL (ZYRTEC PO) Take  by mouth.  cyclobenzaprine (FLEXERIL) 5 mg tablet Take 1 Tab by mouth three (3) times daily as needed for Muscle Spasm(s). 15 Tab 0        ALLERGIES  No Known Allergies       SOCIAL HISTORY    Social History     Social History    Marital status: SINGLE     Spouse name: N/A    Number of children: N/A    Years of education: N/A     Social History Main Topics    Smoking status: Former Smoker    Smokeless tobacco: None    Alcohol use Yes      Comment: 6-7 drinks per week    Drug use: No    Sexual activity: Not Asked     Other Topics Concern    None     Social History Narrative       FAMILY HISTORY  No family history on file. REVIEW OF SYSTEMS  Review of Systems   Constitutional: Negative for chills, diaphoresis, fever, malaise/fatigue and weight loss. Respiratory: Negative for shortness of breath. Cardiovascular: Negative for chest pain and leg swelling. Gastrointestinal: Negative for constipation, nausea and vomiting. Neurological: Negative for dizziness, tingling, seizures, loss of consciousness and headaches.    Psychiatric/Behavioral: The patient does not have insomnia. PHYSICAL EXAMINATION  Visit Vitals    /67    Pulse 85    Temp 98.2 °F (36.8 °C) (Oral)    Resp 20    Ht 6' (1.829 m)    Wt 180 lb (81.6 kg)    SpO2 98%    BMI 24.41 kg/m2       No flowsheet data found. Constitutional:  Well developed, well nourished, in no acute distress. Psychiatric: Affect and mood are appropriate. Integumentary: No rashes or abrasions noted on exposed areas. SPINE/MUSCULOSKELETAL EXAM    Cervical spine:  Neck is midline. Normal muscle tone. No focal atrophy is noted. ROM pain free. Shoulder ROM intact. No tenderness to palpation. Negative Spurling's sign. Negative Tinel's sign. Negative Stanley's sign.       Sensation in the bilateral arms grossly intact to light touch.      Lumbar spine:  No rash, ecchymosis, or gross obliquity. No fasciculations. No focal atrophy is noted. No pain with hip ROM. Full range of motion. No tenderness to palpation. No tenderness to palpation at the sciatic notch. SI joints non-tender. Trochanters non tender. No pain with back flexion or extension.      Decreased sensation in left L5 dermatome. MOTOR:      Biceps  Triceps Deltoids Wrist Ext Wrist Flex Hand Intrin   Right 5/5 5/5 5/5 5/5 5/5 5/5   Left 5/5 5/5 5/5 5/5 5/5 5/5             Hip Flex  Quads Hamstrings Ankle DF EHL Ankle PF   Right 5/5 5/5 5/5 5/5 5/5 5/5   Left 5/5 5/5 5/5 5/5 5/5 5/5     DTRs are 2+ biceps, triceps, brachioradialis, patella, and Achilles.     Positive left straight Leg raise. Squat not tested. No difficulty with tandem gait.      Ambulation without assistive device. FWB.       RADIOGRAPHS  Lumbar MRI images taken on 1/25/2017 personally reviewed with patient:  FINDINGS:      General: Vertebral body heights preserved. L5-S1 mild disc space loss. Elsewhere  disc space heights preserved. No focal listhesis. Mild straightening of the  normal lordosis. Conus ends at L1 level.  No abnormal signal in the distal cord. No abnormal epidural collection identified. No focal suspicious marrow lesions.      Incidental: Surrounding soft tissues unremarkable.      Levels:      T11-T12: Evaluated sagittal plane only. No significant degenerative change or  stenosis.      T12-L1: No significant degenerative changes or stenosis.      L1-L2: Minimal disc bulge. Facets unremarkable. No stenosis.      L2-L3: Minimal disc bulge and small focal left foraminal protrusion. Facets  unremarkable. Spinal canal patent. Mild left foraminal stenosis.      L3-L4: No significant disc disease. Mild facet arthropathy. Spinal canal patent. Mild bilateral foraminal stenosis.      L4-L5: Minimal disc bulge. Mild facet arthropathy. Spinal canal patent. Mild  bilateral foraminal stenosis.      L5-S1: Focal central disc protrusion extruding roughly 1.0 cm posteriorly, mild  facet arthropathy. Abutment of descending bilateral S1 nerve roots and possibly  additional descending nerve roots, with overall mild spinal canal stenosis. Moderate bilateral foraminal stenosis.     Imaged sacrum: Unremarkable.      IMPRESSION  IMPRESSION:          1. At L5-S1 there is pronounced focal central disc protrusion abutting bilateral  descending S1 nerve roots and possibly additional descending nerve roots, with  overall mild spinal canal stenosis. Moderate bilateral foraminal stenosis also.      -Minimal degenerative changes at other levels, causing no more than mild  foraminal stenosis.      reviewed     Mr. Gilford Amis has a reminder for a \"due or due soon\" health maintenance. I have asked that he contact his primary care provider for follow-up on this health maintenance.      This plan was reviewed with the patient and patient agrees. All questions were answered.  More than half of this visit today was spent on counseling.     Written by Tabatha Gonzalez, as dictated by Dr. Jose Melara, Dr. Serjio Rollins, confirm that all documentation is accurate.

## 2017-04-04 ENCOUNTER — HOSPITAL ENCOUNTER (OUTPATIENT)
Dept: PHYSICAL THERAPY | Age: 32
Discharge: HOME | End: 2017-04-04
Payer: COMMERCIAL

## 2017-04-04 PROCEDURE — 97162 PT EVAL MOD COMPLEX 30 MIN: CPT

## 2017-04-04 PROCEDURE — 97110 THERAPEUTIC EXERCISES: CPT

## 2017-04-04 NOTE — PROGRESS NOTES
PT DAILY TREATMENT NOTE 12    Patient Name: Madyson Carlos  Date:2017  : 1985  [x]  Patient  Verified  Payor: Brian Gray / Plan: [place] [employer] / Product Type: HMO /    In time:5:11  Out time:6:00  Total Treatment Time (min): 52  Visit #: 1 of 8    Treatment Area: Radiculopathy, lumbar region [M54.16]    SUBJECTIVE  Pain Level (0-10 scale): 5  Any medication changes, allergies to medications, adverse drug reactions, diagnosis change, or new procedure performed?: [x] No    [] Yes (see summary sheet for update)  Subjective functional status/changes:   [] No changes reported  See POC    OBJECTIVE    41 min [x]Eval                  []Re-Eval     8 min Therapeutic Exercise:  [] See flow sheet :HEP instruction and demonstration, pt education regarding anatomy and physiology of the spine and LEs and how it relates to the pt's condition. Rationale: increase ROM and increase strength to improve the patients ability to tolerate ADLs          With   [] TE   [] TA   [] neuro   [] other: Patient Education: [x] Review HEP    [] Progressed/Changed HEP based on:   [] positioning   [] body mechanics   [] transfers   [] heat/ice application    [] other:      Other Objective/Functional Measures: See evaluation     Pain Level (0-10 scale) post treatment: 7 after performing a sit to stand, 3-4 after walking to     ASSESSMENT/Changes in Function: Pt given HEP handout to perform. Pt understood exercises in HEP handout. Pt demonstrated decreased AROM, decreased strength, tenderness to palpation, impaired pelvic alignment. Pt was unable to straighten his left knee in sitting because of HS tightness but was able to straighten it in supine. Upon standing, pt limps with decreased stance time on his left LE \"because it is stiff\" but the limp decrease as the pt starts walking.  Pt would benefit from physical therapy to improve the above impairments to help the pt return to performing ADLs, functional and work activities. Patient will continue to benefit from skilled PT services to modify and progress therapeutic interventions, address functional mobility deficits, address ROM deficits, address strength deficits, analyze and address soft tissue restrictions, analyze and cue movement patterns, analyze and modify body mechanics/ergonomics, assess and modify postural abnormalities, address imbalance/dizziness and instruct in home and community integration to attain remaining goals.      [x]  See Plan of Care  []  See progress note/recertification  []  See Discharge Summary         Progress towards goals / Updated goals:  See POC    PLAN  [x]  Upgrade activities as tolerated     [x]  Continue plan of care  [x]  Update interventions per flow sheet       []  Discharge due to:_  []  Other:_      Magnolia Libman, PT 4/4/2017  5:55 PM    Future Appointments  Date Time Provider Johann Horni   4/4/2017 5:00 PM Magnolia Libman, PT [facility]   5/4/2017 3:45 PM Ethan Flores  [address]

## 2017-04-04 NOTE — PROGRESS NOTES
[facility]  [address]  [place], [address]  [phone] fax    Plan of Care/ Statement of Necessity for Physical Therapy Services    Patient name: Karyn Mejia Start of Care: 2017   Referral source: Ramy Meadows MD : 1985    Medical Diagnosis: Radiculopathy, lumbar region [M54.16]   Onset Date:worsening 10/2016    Treatment Diagnosis: LBP with radicular left LE pain   Prior Hospitalization: see medical history Provider#: [id number]   Medications: Verified on Patient summary List    Comorbidities: arthritis, asthma, ankylosing spondylitis (per pt report)   Prior Level of Function: Independent with ADLs, functional and work tasks with intermittent mild back pain before the flare up in 10/2016     The Plan of Care and following information is based on the information from the initial evaluation. Assessment/ key information:   Pt is a 32year old male who presents to therapy today with LBP with left posterior LE pain/numbness. Pt states that his symptoms worsened in 2016 with insidious onset but states \"it was not feeling right beforehand\". MRI states \"L5-S1 there is pronounced focal central disc protrusion abutting bilateral descending S1 nerve roots and possibly additional descending nerve roots, with overall mild spinal canal stenosis. Moderate bilateral foraminal stenosis\". Xray states \"marked degenerative disc disease at L5/S1\". Pt states he had injections on his back and states that the injections helped with the pain. Pt reports HS tightness, sharp pain in the posterior left LE, numbness the left posterior LE at times, pain with sitting, and stiffness in the back. Pt also reports he has ankylosing spondylitis. Pt demonstrated decreased AROM, decreased strength, tenderness to palpation, impaired pelvic alignment.  Pt was unable to straighten his left knee in sitting because of HS tightness but was able to straighten it in supine. Upon standing, pt limps with decreased stance time on his left LE \"because it is stiff\" but the limp decrease as the pt starts walking. Pt would benefit from physical therapy to improve the above impairments to help the pt return to performing ADLs, functional and work activities. Evaluation Complexity History MEDIUM  Complexity : 1-2 comorbidities / personal factors will impact the outcome/ POC ; Examination HIGH Complexity : 4+ Standardized tests and measures addressing body structure, function, activity limitation and / or participation in recreation  ;Presentation MEDIUM Complexity : Evolving with changing characteristics  ; Clinical Decision Making MEDIUM Complexity : FOTO score of 26-74  Overall Complexity Rating: MEDIUM  Problem List: pain affecting function, decrease ROM, decrease strength, impaired gait/ balance, decrease ADL/ functional abilitiies, decrease activity tolerance, decrease flexibility/ joint mobility and decrease transfer abilities   Treatment Plan may include any combination of the following: Therapeutic exercise, Therapeutic activities, Neuromuscular re-education, Physical agent/modality, Gait/balance training, Manual therapy, Patient education, Self Care training, Functional mobility training, Home safety training and Stair training  Patient / Family readiness to learn indicated by: asking questions, trying to perform skills and interest  Persons(s) to be included in education: patient (P)  Barriers to Learning/Limitations: None  Patient Goal (s): pain relief and mobility  Patient Self Reported Health Status: fair  Rehabilitation Potential: fair    Short Term Goals: To be accomplished in 1 weeks:  1. Pt will report compliance and independence to [facility] to help the pt manage their pain and symptoms. Long Term Goals: To be accomplished in 4 weeks:  1. Pt will increase FOTO score to 66 points to improve ability to perform ADLs.   2. Pt will be able to walk with mild to no limp on the left LE after performing a sit to stand to improve ease of performing functional tasks. 3. Pt will increase AROM l/s flex to % of WNL with mild to no increased pain in the LEs to improve ability to perform work tasks. 4. Pt will report being able roll over in bed with mild to no increased pain to help the pt perform bed mobility with less pain. Frequency / Duration: Patient to be seen 2 times per week for 4 weeks. Patient/ Caregiver education and instruction: Diagnosis, prognosis, self care, activity modification and exercises   [x]  Plan of care has been reviewed with RENETTA Naranjo, PT 4/4/2017 5:44 PM    ________________________________________________________________________    I certify that the above Therapy Services are being furnished while the patient is under my care. I agree with the treatment plan and certify that this therapy is necessary.     [de-identified] Signature:____________________  Date:____________Time: _________    Please sign and return to [facility]  [address]  [place], [address]  [phone] fax

## 2017-04-11 ENCOUNTER — HOSPITAL ENCOUNTER (OUTPATIENT)
Dept: PHYSICAL THERAPY | Age: 32
Discharge: HOME | End: 2017-04-11
Payer: COMMERCIAL

## 2017-04-11 PROCEDURE — 97140 MANUAL THERAPY 1/> REGIONS: CPT

## 2017-04-11 PROCEDURE — 97110 THERAPEUTIC EXERCISES: CPT

## 2017-04-11 PROCEDURE — 97112 NEUROMUSCULAR REEDUCATION: CPT

## 2017-04-11 NOTE — PROGRESS NOTES
PT DAILY TREATMENT NOTE     Patient Name: Jaclyn Forman  Date:2017  : 1985  [x]  Patient  Verified  Payor: Marychuy Osborn / Plan: [employer] / Product Type: HMO /    In time:6:00  Out time:6:57  Total Treatment Time (min): 79  Visit #: 2 of 8    Treatment Area: Radiculopathy, lumbar region [M54.16]    SUBJECTIVE  Pain Level (0-10 scale): 7  Any medication changes, allergies to medications, adverse drug reactions, diagnosis change, or new procedure performed?: [x] No    [] Yes (see summary sheet for update)  Subjective functional status/changes:   [] No changes reported  \"I was sitting all day at work and I was feeling it\"    OBJECTIVE    47 min Therapeutic Exercise:  [x] See flow sheet :   Rationale: increase ROM and increase strength to improve the patients ability to tolerate ADLs    10 min Neuromuscular Re-education:  [x]  See flow sheet : core stability exercises   Rationale: increase ROM, increase strength and increase proprioception  to improve the patients ability to tolerate work tasks     10 min Manual Therapy: right LE pull, pelvic alignment/leg length assessment, shotgun after. Rationale: decrease pain, increase ROM, increase tissue extensibility and increase postural awareness to improve activity tolerance          With   [] TE   [] TA   [] neuro   [] other: Patient Education: [x] Review HEP    [] Progressed/Changed HEP based on:   [] positioning   [] body mechanics   [] transfers   [] heat/ice application    [] other:      Other Objective/Functional Measures: initiated exercises/interventions in flow sheet. Pain Level (0-10 scale) post treatment: 3    ASSESSMENT/Changes in Function: Reported improvement in symptoms after shotgun technique. Educated pt to avoid movements that increase radicular symptoms. Educated pt to stop back EXT stretch for HEP and switch with prayer stretch if this improves his pain/symptoms.  Also educated pt that he may feel slight increase in LBP (secondary to trying to increase mobility in the low back and pelvic region) with HEP exercises but he should not feel increased radicular symptoms with these exercises and to stop doing these exercises if they increase these symptoms. Continue POC as tolerated. Patient will continue to benefit from skilled PT services to modify and progress therapeutic interventions, address functional mobility deficits, address ROM deficits, address strength deficits, analyze and address soft tissue restrictions, analyze and cue movement patterns, analyze and modify body mechanics/ergonomics, assess and modify postural abnormalities and instruct in home and community integration to attain remaining goals. []  See Plan of Care  []  See progress note/recertification  []  See Discharge Summary         Progress towards goals / Updated goals:  Short Term Goals: To be accomplished in 1 weeks:  1. Pt will report compliance and independence to HEP to help the pt manage their pain and symptoms. Long Term Goals: To be accomplished in 4 weeks:  1. Pt will increase FOTO score to 66 points to improve ability to perform ADLs. 2. Pt will be able to walk with mild to no limp on the left LE after performing a sit to stand to improve ease of performing functional tasks. 3. Pt will increase AROM l/s flex to % of WNL with mild to no increased pain in the LEs to improve ability to perform work tasks. 4. Pt will report being able roll over in bed with mild to no increased pain to help the pt perform bed mobility with less pain.      PLAN  [x]  Upgrade activities as tolerated     [x]  Continue plan of care  [x]  Update interventions per flow sheet       []  Discharge due to:_  []  Other:_      Aden Mary, PT 4/11/2017  6:28 PM    Future Appointments  Date Time Provider Johann Mead   4/14/2017 5:30 PM Jose Baldwin, PT [facility]   4/17/2017 5:30 PM Krista Doran, PTA [facility]   4/21/2017 6:00 PM Gustavo Gomes [facility]   4/24/2017 6:00 PM Donna Stoddard, PTA [facility]   4/27/2017 6:00 PM [address] [facility]   5/1/2017 6:00 PM [address] [facility]   5/3/2017 6:00 PM Cherry Thomas, PT [facility]   5/4/2017 3:45 PM Kit eHrr  [address]

## 2017-04-14 ENCOUNTER — APPOINTMENT (OUTPATIENT)
Dept: PHYSICAL THERAPY | Age: 32
End: 2017-04-14
Payer: COMMERCIAL

## 2017-04-17 ENCOUNTER — HOSPITAL ENCOUNTER (OUTPATIENT)
Dept: PHYSICAL THERAPY | Age: 32
Discharge: HOME | End: 2017-04-17
Payer: COMMERCIAL

## 2017-04-17 PROCEDURE — 97112 NEUROMUSCULAR REEDUCATION: CPT

## 2017-04-17 PROCEDURE — 97110 THERAPEUTIC EXERCISES: CPT

## 2017-04-17 NOTE — PROGRESS NOTES
PT DAILY TREATMENT NOTE     Patient Name: Kraig Bell  Date:2017  : 1985  [x]  Patient  Verified  Payor: Augustine Gallagher / Plan: [employer] / Product Type: HMO /    In time:5:42  Out time:6:17  Total Treatment Time (min): 35   Visit #: 3 of 8    Treatment Area: Radiculopathy, lumbar region [M54.16]    SUBJECTIVE  Pain Level (0-10 scale): 4/10  Any medication changes, allergies to medications, adverse drug reactions, diagnosis change, or new procedure performed?: [x] No    [] Yes (see summary sheet for update)  Subjective functional status/changes:   [] No changes reported  Pt reports no new complaints. Pt reports no pain when performing HEP. \"I am just stiff all the time. \"    OBJECTIVE    25 min Therapeutic Exercise:  [x] See flow sheet :   Rationale: increase ROM and increase strength to improve the patients ability to tolerate ADLs. 10 min Neuromuscular Re-education:  [x]  See flow sheet :   Rationale: increase strength, improve coordination and increase proprioception  to improve the patients ability to perform functional activities. With   [] TE   [] TA   [] neuro   [] other: Patient Education: [x] Review HEP    [] Progressed/Changed HEP based on:   [] positioning   [] body mechanics   [] transfers   [] heat/ice application    [] other:      Other Objective/Functional Measures: neutral pelvic alignment. Pt had radicular symptoms with (R) sidelying IR clams, symptoms resolved when in hooklying position. Pain Level (0-10 scale) post treatment: 4/10    ASSESSMENT/Changes in Function: Pt has some increased symptoms with clams but symptoms resolved once in a neutral spine position.      Patient will continue to benefit from skilled PT services to modify and progress therapeutic interventions, address functional mobility deficits, address ROM deficits, address strength deficits, analyze and address soft tissue restrictions, analyze and cue movement patterns and analyze and modify body mechanics/ergonomics to attain remaining goals. []  See Plan of Care  []  See progress note/recertification  []  See Discharge Summary         Progress towards goals / Updated goals:  Short Term Goals: To be accomplished in 1 weeks:  1. Pt will report compliance and independence to HEP to help the pt manage their pain and symptoms. -progressing per patient report. 04/17/17  Long Term Goals: To be accomplished in 4 weeks:  1. Pt will increase FOTO score to 66 points to improve ability to perform ADLs. 2. Pt will be able to walk with mild to no limp on the left LE after performing a sit to stand to improve ease of performing functional tasks. 3. Pt will increase AROM l/s flex to % of WNL with mild to no increased pain in the LEs to improve ability to perform work tasks. 4. Pt will report being able roll over in bed with mild to no increased pain to help the pt perform bed mobility with less pain.      PLAN  []  Upgrade activities as tolerated     [x]  Continue plan of care  []  Update interventions per flow sheet       []  Discharge due to:_  []  Other:_      Donna Stoddard PTA 4/17/2017  6:06 PM    Future Appointments  Date Time Provider Johann Mead   4/21/2017 6:00 PM [address] [facility]   4/24/2017 6:00 PM Donna Stoddard PTA [facility]   4/27/2017 6:00 PM [address] [facility]   5/1/2017 6:00 PM [address] [facility]   5/3/2017 6:00 PM Cherry Thomas, PT [facility]   5/4/2017 3:45 PM Kit Herr  [address]

## 2017-04-18 ENCOUNTER — HOSPITAL ENCOUNTER (OUTPATIENT)
Age: 32
Setting detail: OUTPATIENT SURGERY
Discharge: HOME | End: 2017-04-18
Attending: PHYSICAL MEDICINE & REHABILITATION | Admitting: PHYSICAL MEDICINE & REHABILITATION
Payer: COMMERCIAL

## 2017-04-18 ENCOUNTER — APPOINTMENT (OUTPATIENT)
Dept: GENERAL RADIOLOGY | Age: 32
End: 2017-04-18
Attending: PHYSICAL MEDICINE & REHABILITATION
Payer: COMMERCIAL

## 2017-04-18 VITALS
TEMPERATURE: 98.4 F | BODY MASS INDEX: 23.7 KG/M2 | HEART RATE: 77 BPM | HEIGHT: 72 IN | DIASTOLIC BLOOD PRESSURE: 73 MMHG | OXYGEN SATURATION: 100 % | SYSTOLIC BLOOD PRESSURE: 114 MMHG | RESPIRATION RATE: 18 BRPM | WEIGHT: 175 LBS

## 2017-04-18 PROCEDURE — 77030003669 HC NDL SPN COOK -B: Performed by: PHYSICAL MEDICINE & REHABILITATION

## 2017-04-18 PROCEDURE — 76010000009 HC PAIN MGT 0 TO 30 MIN PROC: Performed by: PHYSICAL MEDICINE & REHABILITATION

## 2017-04-18 PROCEDURE — 77030003672 HC NDL SPN HALY -A: Performed by: PHYSICAL MEDICINE & REHABILITATION

## 2017-04-18 PROCEDURE — 74011000250 HC RX REV CODE- 250

## 2017-04-18 PROCEDURE — 77030003543 HC NDL EPDRL BBMI -A: Performed by: PHYSICAL MEDICINE & REHABILITATION

## 2017-04-18 PROCEDURE — 74011250636 HC RX REV CODE- 250/636

## 2017-04-18 PROCEDURE — 74011250637 HC RX REV CODE- 250/637: Performed by: PHYSICAL MEDICINE & REHABILITATION

## 2017-04-18 PROCEDURE — 74011636320 HC RX REV CODE- 636/320

## 2017-04-18 RX ORDER — LIDOCAINE HYDROCHLORIDE 10 MG/ML
INJECTION, SOLUTION EPIDURAL; INFILTRATION; INTRACAUDAL; PERINEURAL AS NEEDED
Status: DISCONTINUED | OUTPATIENT
Start: 2017-04-18 | End: 2017-04-18 | Stop reason: HOSPADM

## 2017-04-18 RX ORDER — DIAZEPAM 5 MG/1
2.5-1 TABLET ORAL ONCE
Status: COMPLETED | OUTPATIENT
Start: 2017-04-18 | End: 2017-04-18

## 2017-04-18 RX ORDER — DEXAMETHASONE SODIUM PHOSPHATE 100 MG/10ML
INJECTION INTRAMUSCULAR; INTRAVENOUS AS NEEDED
Status: DISCONTINUED | OUTPATIENT
Start: 2017-04-18 | End: 2017-04-18 | Stop reason: HOSPADM

## 2017-04-18 RX ADMIN — DIAZEPAM 10 MG: 5 TABLET ORAL at 13:21

## 2017-04-18 NOTE — INTERVAL H&P NOTE
H&P Update:  Krys Avitia was seen and briefly examined. History and physical has been reviewed.  T There have been no significant clinical changes since the completion of the originally dated History and Physical.    Signed By: Jordyn Martinez MD     April 18, 2017 1:32 PM

## 2017-04-18 NOTE — H&P (VIEW-ONLY)
[address]  [address], [address]  [facility], [address]  Phone: [phone]  Fax: [phone]        Lola Jones  : 1985  PCP: Rocael Galvan MD  3/24/2017    PROGRESS NOTE      ASSESSMENT AND PLAN    Gretel Acosta comes in to the office today for a left L5 and S1 SNRB f/u. He found great relief with the injections, particularly with his ability to sleep through the night. However, he is continuing to experience the same posterior left leg pain the ends at the top of his foot. He was referred for a second set of left L5 and S1 SNRBs. Pt was also interested in beginning PT so he was referred for that as well. He was prescribed an increased dose of Gabapentin (2x 300 mg TID from 300 mg TID). Pt will f/u in 3 weeks or prn. Bev Sheridan was seen today for back pain. Diagnoses and all orders for this visit:    Lumbar radiculopathy  -     SCHEDULE SURGERY  -     REFERRAL TO PHYSICAL THERAPY  -     gabapentin (NEURONTIN) 300 mg capsule; Take 2 Caps by mouth three (3) times daily. Follow-up Disposition:  Return in about 3 weeks (around 2017), or if symptoms worsen or fail to improve. HISTORY OF PRESENT ILLNESS  Gretel Acosta is a 32 y.o. male. A&P / HPI from 2/15/2017:  Gretel Acosta comes in to the office today c/o 6-10/10 aching lumbar pain that radiates down the posterior left leg to the top of his foot x 4 months. He has had a lumbar MRI which shows a 1.0 cm posterior L5-S1 disc protrusion with abutment of the bilateral S1 nerve roots and potentially other nerve roots. His pain is likely due to a left L5 and S1 radiculopathy.     He was referred for left L5 and S1 SNRBs. 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     Pt will f/u in 3 weeks or prn. Updates from 17:  Pt presents for a left L5 and S1 SNRB f/u.  He found great relief with the injections, particularly with his ability to sleep through the night. However, he is continuing to experience the same posterior left leg pain the ends at the top of his foot. Pt is currently taking Gabapentin 300 mg TID. PAST MEDICAL HISTORY   Past Medical History:   Diagnosis Date    Asthma     Spondylitis [facility])        Past Surgical History:   Procedure Laterality Date    HX TONSIL AND ADENOIDECTOMY      NEUROLOGICAL PROCEDURE UNLISTED  02/28/2017    block   . MEDICATIONS      Current Outpatient Prescriptions   Medication Sig Dispense Refill    indomethacin (INDOCIN) 25 mg capsule Take 50 mg by mouth three (3) times daily. Pt unsure of dose.  gabapentin (NEURONTIN) 300 mg capsule Take 1 Cap by mouth three (3) times daily. 90 Cap 0    naproxen (NAPROSYN) 500 mg tablet Take 500 mg by mouth two (2) times daily (with meals).  CETIRIZINE HCL (ZYRTEC PO) Take  by mouth.  cyclobenzaprine (FLEXERIL) 5 mg tablet Take 1 Tab by mouth three (3) times daily as needed for Muscle Spasm(s). 15 Tab 0        ALLERGIES  No Known Allergies       SOCIAL HISTORY    Social History     Social History    Marital status: SINGLE     Spouse name: N/A    Number of children: N/A    Years of education: N/A     Social History Main Topics    Smoking status: Former Smoker    Smokeless tobacco: None    Alcohol use Yes      Comment: 6-7 drinks per week    Drug use: No    Sexual activity: Not Asked     Other Topics Concern    None     Social History Narrative       FAMILY HISTORY  No family history on file. REVIEW OF SYSTEMS  Review of Systems   Constitutional: Negative for chills, diaphoresis, fever, malaise/fatigue and weight loss. Respiratory: Negative for shortness of breath. Cardiovascular: Negative for chest pain and leg swelling. Gastrointestinal: Negative for constipation, nausea and vomiting. Neurological: Negative for dizziness, tingling, seizures, loss of consciousness and headaches.    Psychiatric/Behavioral: The patient does not have insomnia. PHYSICAL EXAMINATION  Visit Vitals    /67    Pulse 85    Temp 98.2 °F (36.8 °C) (Oral)    Resp 20    Ht 6' (1.829 m)    Wt 180 lb (81.6 kg)    SpO2 98%    BMI 24.41 kg/m2       No flowsheet data found. Constitutional:  Well developed, well nourished, in no acute distress. Psychiatric: Affect and mood are appropriate. Integumentary: No rashes or abrasions noted on exposed areas. SPINE/MUSCULOSKELETAL EXAM    Cervical spine:  Neck is midline. Normal muscle tone. No focal atrophy is noted. ROM pain free. Shoulder ROM intact. No tenderness to palpation. Negative Spurling's sign. Negative Tinel's sign. Negative Stanley's sign.       Sensation in the bilateral arms grossly intact to light touch.      Lumbar spine:  No rash, ecchymosis, or gross obliquity. No fasciculations. No focal atrophy is noted. No pain with hip ROM. Full range of motion. No tenderness to palpation. No tenderness to palpation at the sciatic notch. SI joints non-tender. Trochanters non tender. No pain with back flexion or extension.      Decreased sensation in left L5 dermatome. MOTOR:      Biceps  Triceps Deltoids Wrist Ext Wrist Flex Hand Intrin   Right 5/5 5/5 5/5 5/5 5/5 5/5   Left 5/5 5/5 5/5 5/5 5/5 5/5             Hip Flex  Quads Hamstrings Ankle DF EHL Ankle PF   Right 5/5 5/5 5/5 5/5 5/5 5/5   Left 5/5 5/5 5/5 5/5 5/5 5/5     DTRs are 2+ biceps, triceps, brachioradialis, patella, and Achilles.     Positive left straight Leg raise. Squat not tested. No difficulty with tandem gait.      Ambulation without assistive device. FWB.       RADIOGRAPHS  Lumbar MRI images taken on 1/25/2017 personally reviewed with patient:  FINDINGS:      General: Vertebral body heights preserved. L5-S1 mild disc space loss. Elsewhere  disc space heights preserved. No focal listhesis. Mild straightening of the  normal lordosis. Conus ends at L1 level.  No abnormal signal in the distal cord. No abnormal epidural collection identified. No focal suspicious marrow lesions.      Incidental: Surrounding soft tissues unremarkable.      Levels:      T11-T12: Evaluated sagittal plane only. No significant degenerative change or  stenosis.      T12-L1: No significant degenerative changes or stenosis.      L1-L2: Minimal disc bulge. Facets unremarkable. No stenosis.      L2-L3: Minimal disc bulge and small focal left foraminal protrusion. Facets  unremarkable. Spinal canal patent. Mild left foraminal stenosis.      L3-L4: No significant disc disease. Mild facet arthropathy. Spinal canal patent. Mild bilateral foraminal stenosis.      L4-L5: Minimal disc bulge. Mild facet arthropathy. Spinal canal patent. Mild  bilateral foraminal stenosis.      L5-S1: Focal central disc protrusion extruding roughly 1.0 cm posteriorly, mild  facet arthropathy. Abutment of descending bilateral S1 nerve roots and possibly  additional descending nerve roots, with overall mild spinal canal stenosis. Moderate bilateral foraminal stenosis.     Imaged sacrum: Unremarkable.      IMPRESSION  IMPRESSION:          1. At L5-S1 there is pronounced focal central disc protrusion abutting bilateral  descending S1 nerve roots and possibly additional descending nerve roots, with  overall mild spinal canal stenosis. Moderate bilateral foraminal stenosis also.      -Minimal degenerative changes at other levels, causing no more than mild  foraminal stenosis.      reviewed     Mr. Rob Bui has a reminder for a \"due or due soon\" health maintenance. I have asked that he contact his primary care provider for follow-up on this health maintenance.      This plan was reviewed with the patient and patient agrees. All questions were answered.  More than half of this visit today was spent on counseling.     Written by Luisa Hayes, as dictated by Dr. Rodolfo Gonzalez, Dr. Balwinder Naqvi, confirm that all documentation is accurate.

## 2017-04-18 NOTE — PROCEDURES
PROCEDURE NOTE      Patient Name: Juliana Ernst    Date of Procedure: April 18, 2017    Preoperative Diagnosis:  Acute left lumbar radiculopathy [M54.16]  Bulge of lumbar disc without myelopathy [M51.26]    Post Operative Diagnosis:  Acute left lumbar radiculopathy [M54.16]  Bulge of lumbar disc without myelopathy [M51.26]    Procedure :    left L5 Selective Nerve Root Block  left S1 Selective Nerve Root Block    Consent:  Informed consent was obtained prior to the procedure. The patient was given the opportunity to ask questions regarding the procedure and its associated risks. In addition to the potential risks associated with the procedure itself, the patient was informed both verbally and in writing of the potential side effects of the use of glucocorticoid. The patient appeared to comprehend the informed consent and desired to have the procedure performed. Procedure: The patient was placed in the prone position on the fluoroscopy table and the back was prepped and draped in the usual sterile manner. The exact spinal level was  identified using fluoroscopy, and Lidocaine 1 % was injected locally, a # 22 gauge spinal needle was passed to the transverse process. The depth was noted and the needle redirected to pass inferior and approximately one cm anterior to the transverse process. YES  1 cc of Isovue M-200 was used to verify positioning in the epidural and paravertebral space and outlined the course of the spinal nerve into the epidural space. The same procedure was repeated at each spinal level indicated above. No vascular uptake was identified. A total of 30 mg of preservative free Dexamethasone and 2 cc of Lidocaine was slowly injected. The patient tolerated the procedure well. The injection area was cleaned and bandaids applied. Not excessive bleeding was noted. Patient dressed and discharged to home with instructions.     Discussion: The patient tolerated the procedure well.                                              Juanis Hernandez MD  April 18, 2017

## 2017-04-18 NOTE — DISCHARGE INSTRUCTIONS
[facility]   (EDMUND)  Dr. Filipe Maxwell, Dr. Curtis Gaming, Dr. Brian Steven not drive a car, operate heavy machinery or dangerous equipment for 24 hours. * Activity as tolerated; rest for the remainder of the day. * Resume pre-block medications including those for your family doctor. * Do not drink alcoholic beverages for 24 hours. Alcohol and the medications you have received may interact and cause an adverse reaction. * You may feel better this evening and worse tomorrow, as the numbing medications wears off and the steroid has yet to begin to work. After 48 hrs the steroid should begin to release bringing you relief. * You may shower this evening and remove any bandages. * Avoid hot tubs and heating pads for 24 hours. You may use cold packs on the procedure site as tolerated for the first 24 hours. * If a headache develops, drink plenty of fluids and rest.  Take over the counter medications for headache if needed. If the headache continues longer than 24 hours, call MD at the [address] [place] [address]. [phone]    * Continue taking pain medications as needed. * You may resume your regular diet if tolerated. Otherwise, start with sips of water and advance slowly. * If Diabetic: check your blood sugar three times a day for the next 3 days. If your sugar is greater than 300 call your family doctor. If your sugar is greater than 400, have someone transport you to the nearest Emergency Room. * If you experience any of the following problems, Please Call the [address] [place] [address] at [phone].         * Shortness of Breath    * Fever of 101 or higher    * Nausea / Vomiting    * Severe Headache    * Weakness or numbness in arms or legs that is not      resolving    * Prolonged increase in pain greater than 4 days      DISCHARGE SUMMARY from Nurse      PATIENT INSTRUCTIONS:    After oral sedation, for 24 hours or while taking prescription Narcotics:  · Limit your activities  · Do not drive and operate hazardous machinery  · Do not make important personal or business decisions  · Do  not drink alcoholic beverages  · If you have not urinated within 8 hours after discharge, please contact your surgeon on call. Report the following to your surgeon:  · Excessive pain, swelling, redness or odor of or around the surgical area  · Temperature over 101  · Nausea and vomiting lasting longer than 4 hours or if unable to take medications  · Any signs of decreased circulation or nerve impairment to extremity: change in color, persistent  numbness, tingling, coldness or increase pain  · Any questions            What to do at Home:  Recommended activity: Activity as tolerated, NO DRIVING FOR 12 Hours post injection          *  Please give a list of your current medications to your Primary Care Provider. *  Please update this list whenever your medications are discontinued, doses are      changed, or new medications (including over-the-counter products) are added. *  Please carry medication information at all times in case of emergency situations. These are general instructions for a healthy lifestyle:    No smoking/ No tobacco products/ Avoid exposure to second hand smoke    Surgeon General's Warning:  Quitting smoking now greatly reduces serious risk to your health. Obesity, smoking, and sedentary lifestyle greatly increases your risk for illness    A healthy diet, regular physical exercise & weight monitoring are important for maintaining a healthy lifestyle    You may be retaining fluid if you have a history of heart failure or if you experience any of the following symptoms:  Weight gain of 3 pounds or more overnight or 5 pounds in a week, increased swelling in our hands or feet or shortness of breath while lying flat in bed.   Please call your doctor as soon as you notice any of these symptoms; do not wait until your next office visit. Recognize signs and symptoms of STROKE:    F-face looks uneven    A-arms unable to move or move unevenly    S-speech slurred or non-existent    T-time-call 911 as soon as signs and symptoms begin-DO NOT go       Back to bed or wait to see if you get better-TIME IS BRAIN.

## 2017-04-21 ENCOUNTER — HOSPITAL ENCOUNTER (OUTPATIENT)
Dept: PHYSICAL THERAPY | Age: 32
Discharge: HOME | End: 2017-04-21
Payer: COMMERCIAL

## 2017-04-21 PROCEDURE — 97110 THERAPEUTIC EXERCISES: CPT

## 2017-04-21 PROCEDURE — 97140 MANUAL THERAPY 1/> REGIONS: CPT

## 2017-04-21 PROCEDURE — 97112 NEUROMUSCULAR REEDUCATION: CPT

## 2017-04-21 NOTE — PROGRESS NOTES
PT DAILY TREATMENT NOTE 8-14    Patient Name: Angela Raines  Date:2017  : 1985  [x]  Patient  Verified  Payor: Tiara Blackwell / Plan: [place] [employer] / Product Type: HMO /    In time:6:00pm  Out time: 6:47pm  Total Treatment Time (min): 52  Visit #: 4 of 8    Treatment Area: Radiculopathy, lumbar region [M54.16]    SUBJECTIVE  Pain Level (0-10 scale): 4  Any medication changes, allergies to medications, adverse drug reactions, diagnosis change, or new procedure performed?: [x] No    [] Yes (see summary sheet for update)  Subjective functional status/changes:   [] No changes reported  \"I got injection in my spine again. I think they worked this time. \"    OBJECTIVE  29 min Therapeutic Exercise:  [x] See flow sheet :   Rationale: increase ROM and increase strength to improve the patients ability to improve ADL ease. 8 min Manual Therapy:  TPR to L piriformis, manual piriformis stretch, L L/S manual distraction, pelvic alignment check   Rationale: decrease pain, increase ROM and increase tissue extensibility to improve ease of ADLs. 10 min Neuromuscular Re-education:  [x]  See flow sheet :   Rationale: increase strength, improve coordination and increase proprioception  to improve the patients ability to improve ADl ease.            With   [] TE   [] TA   [] neuro   [] other: Patient Education: [x] Review HEP    [] Progressed/Changed HEP based on:   [] positioning   [] body mechanics   [] transfers   [] heat/ice application    [] other:      Other Objective/Functional Measures:     Pt reports no change in symptoms with manual L/S distraction    Level pelvic alignment    Referral of L post thigh symptoms with deep palpation of L piriformis    Reports increased R post thigh pain with R trunk rotation than L, but no increase in pain post repeated performance    Pain Level (0-10 scale) post treatment: 4-5    ASSESSMENT/Changes in Function: Pt reports no significant change in symptoms with treatment, but does demonstrate symptom referral with palpation of L piriformis. Will continue to assess L/S vs hip contribution to pt presentation in upcoming visits. Discussed continued HEP performance with pt and emphasized tracking response to his symptoms after completion of HEP to determine treatment response to HEP. Pt verbalized understanding. Continue per POC. Patient will continue to benefit from skilled PT services to modify and progress therapeutic interventions, address functional mobility deficits, address ROM deficits, address strength deficits, analyze and address soft tissue restrictions, analyze and cue movement patterns, analyze and modify body mechanics/ergonomics, assess and modify postural abnormalities and instruct in home and community integration to attain remaining goals. []  See Plan of Care  []  See progress note/recertification  []  See Discharge Summary         Progress towards goals / Updated goals:  Short Term Goals: To be accomplished in 1 weeks:  1. Pt will report compliance and independence to HEP to help the pt manage their pain and symptoms. -progressing per patient report. 04/17/17  Long Term Goals: To be accomplished in 4 weeks:  1. Pt will increase FOTO score to 66 points to improve ability to perform ADLs. 2. Pt will be able to walk with mild to no limp on the left LE after performing a sit to stand to improve ease of performing functional tasks. 3. Pt will increase AROM l/s flex to % of WNL with mild to no increased pain in the LEs to improve ability to perform work tasks. 4. Pt will report being able roll over in bed with mild to no increased pain to help the pt perform bed mobility with less pain.      PLAN  [x]  Upgrade activities as tolerated     [x]  Continue plan of care  []  Update interventions per flow sheet       []  Discharge due to:_  []  Other:_      Hermann Todd, PT, DPT, ATC, CSCS 4/21/2017  6:08 PM

## 2017-04-27 ENCOUNTER — APPOINTMENT (OUTPATIENT)
Dept: PHYSICAL THERAPY | Age: 32
End: 2017-04-27
Payer: COMMERCIAL

## 2017-05-16 ENCOUNTER — OFFICE VISIT (OUTPATIENT)
Dept: ORTHOPEDIC SURGERY | Age: 32
End: 2017-05-16

## 2017-05-16 VITALS
BODY MASS INDEX: 23.7 KG/M2 | HEIGHT: 72 IN | HEART RATE: 84 BPM | SYSTOLIC BLOOD PRESSURE: 131 MMHG | RESPIRATION RATE: 14 BRPM | WEIGHT: 175 LBS | DIASTOLIC BLOOD PRESSURE: 70 MMHG

## 2017-05-16 DIAGNOSIS — M51.26 LUMBAR HERNIATED DISC: Primary | ICD-10-CM

## 2017-05-16 DIAGNOSIS — M54.16 LUMBAR RADICULOPATHY: ICD-10-CM

## 2017-05-16 RX ORDER — TRAMADOL HYDROCHLORIDE 50 MG/1
50 TABLET ORAL
Qty: 60 TAB | Refills: 0 | Status: SHIPPED | OUTPATIENT
Start: 2017-05-16

## 2017-05-16 NOTE — PROGRESS NOTES
[address]  [address]  [place], [address] [place] [address] Street  Phone: [phone]  Fax: [phone]        Gentry Foster  : 1985  PCP: Cathy Bobby MD  2017    PROGRESS NOTE      ASSESSMENT AND PLAN    Juliana Ernst comes in to the office today for a second set of left L5 and S1 SNRBs and a lumbar PT f/u. He found less relief with this set of injections and states that his pain is now exacerbated particularly with standing. He will speak with Dr. Jose Desir to see if something may be done from a surgical standpoint. Pt was prescribed Tramadol 50 mg BID prn. Pt will f/u with Dr. Jose Desir or sooner as needed. Chantell Porter was seen today for back pain and follow-up. Diagnoses and all orders for this visit:    Lumbar herniated disc  -     traMADol (ULTRAM) 50 mg tablet; Take 1 Tab by mouth two (2) times daily as needed for Pain. Max Daily Amount: 100 mg. Lumbar radiculopathy  -     traMADol (ULTRAM) 50 mg tablet; Take 1 Tab by mouth two (2) times daily as needed for Pain. Max Daily Amount: 100 mg. Follow-up Disposition:  Return if symptoms worsen or fail to improve. HISTORY OF PRESENT ILLNESS  Juliana Ernst is a 32 y.o. male. A&P / HPI from 2/15/2017:  Juliana Ernst comes in to the office today c/o 6-10/10 aching lumbar pain that radiates down the posterior left leg to the top of his foot x 4 months. He has had a lumbar MRI which shows a 1.0 cm posterior L5-S1 disc protrusion with abutment of the bilateral S1 nerve roots and potentially other nerve roots. His pain is likely due to a left L5 and S1 radiculopathy.      He was referred for left L5 and S1 SNRBs. 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      Pt will f/u in 3 weeks or prn.     Updates from 17:  Pt presents for a left L5 and S1 SNRB f/u.  He found great relief with the injections, particularly with his ability to sleep through the night. However, he is continuing to experience the same posterior left leg pain the ends at the top of his foot.     Pt is currently taking Gabapentin 300 mg TID. Updates from 05/16/17:  Pt presents for a second set of left L5 and S1 SNRBs and a lumbar PT f/u. He found less relief with this set of injections and states that his pain is now exacerbated particularly with standing. PAST MEDICAL HISTORY   Past Medical History:   Diagnosis Date    Asthma     Spondylitis [facility])        Past Surgical History:   Procedure Laterality Date    HX TONSIL AND ADENOIDECTOMY      NEUROLOGICAL PROCEDURE UNLISTED  02/28/2017    block   . MEDICATIONS      Current Outpatient Prescriptions   Medication Sig Dispense Refill    CETIRIZINE HCL (ZYRTEC PO) Take  by mouth.  gabapentin (NEURONTIN) 300 mg capsule Take 2 Caps by mouth three (3) times daily. 180 Cap 2    indomethacin (INDOCIN) 25 mg capsule Take 50 mg by mouth three (3) times daily. Pt unsure of dose.  cyclobenzaprine (FLEXERIL) 5 mg tablet Take 1 Tab by mouth three (3) times daily as needed for Muscle Spasm(s). 15 Tab 0        ALLERGIES  No Known Allergies       SOCIAL HISTORY    Social History     Social History    Marital status: SINGLE     Spouse name: N/A    Number of children: N/A    Years of education: N/A     Social History Main Topics    Smoking status: Former Smoker    Smokeless tobacco: Not on file    Alcohol use Yes      Comment: 6-7 drinks per week    Drug use: No    Sexual activity: Not on file     Other Topics Concern    Not on file     Social History Narrative       FAMILY HISTORY    Family History   Problem Relation Age of Onset    Diabetes Mother     No Known Problems Father          REVIEW OF SYSTEMS  Review of Systems   Constitutional: Negative for chills, diaphoresis, fever, malaise/fatigue and weight loss. Respiratory: Negative for shortness of breath.     Cardiovascular: Negative for chest pain and leg swelling. Gastrointestinal: Negative for constipation, nausea and vomiting. Neurological: Negative for dizziness, tingling, seizures, loss of consciousness and headaches. Psychiatric/Behavioral: The patient does not have insomnia. PHYSICAL EXAMINATION  Visit Vitals    /70    Pulse 84    Resp 14    Ht 6' (1.829 m)    Wt 175 lb (79.4 kg)    BMI 23.73 kg/m2       Pain Assessment  5/16/2017   Location of Pain Back   Location Modifiers Left   Severity of Pain 8   Quality of Pain Aching   Duration of Pain Persistent   Frequency of Pain Constant   Result of Injury No           Constitutional:  Well developed, well nourished, in no acute distress. Psychiatric: Affect and mood are appropriate. Integumentary: No rashes or abrasions noted on exposed areas. SPINE/MUSCULOSKELETAL EXAM    Cervical spine:  Neck is midline. Normal muscle tone. No focal atrophy is noted. ROM pain free. Shoulder ROM intact. No tenderness to palpation. Negative Spurling's sign. Negative Tinel's sign. Negative Stanley's sign.       Sensation in the bilateral arms grossly intact to light touch.       Lumbar spine:  No rash, ecchymosis, or gross obliquity. No fasciculations. No focal atrophy is noted. No pain with hip ROM. Full range of motion. No tenderness to palpation. No tenderness to palpation at the sciatic notch. SI joints non-tender. Trochanters non tender. No pain with back flexion or extension.      Decreased sensation in left L5 dermatome. MOTOR:      Biceps  Triceps Deltoids Wrist Ext Wrist Flex Hand Intrin   Right 5/5 5/5 5/5 5/5 5/5 5/5   Left 5/5 5/5 5/5 5/5 5/5 5/5             Hip Flex  Quads Hamstrings Ankle DF EHL Ankle PF   Right 5/5 5/5 5/5 5/5 5/5 5/5   Left 5/5 5/5 5/5 5/5 5/5 5/5     DTRs are 2+ biceps, triceps, brachioradialis, patella, and Achilles.      Positive left straight Leg raise. Squat not tested.    No difficulty with tandem gait.       Ambulation without assistive device. FWB.       RADIOGRAPHS  Lumbar MRI images taken on 1/25/2017 personally reviewed with patient:  FINDINGS:      General: Vertebral body heights preserved. L5-S1 mild disc space loss. Elsewhere  disc space heights preserved. No focal listhesis. Mild straightening of the  normal lordosis. Conus ends at L1 level. No abnormal signal in the distal cord. No abnormal epidural collection identified. No focal suspicious marrow lesions.      Incidental: Surrounding soft tissues unremarkable.      Levels:      T11-T12: Evaluated sagittal plane only. No significant degenerative change or  stenosis.      T12-L1: No significant degenerative changes or stenosis.      L1-L2: Minimal disc bulge. Facets unremarkable. No stenosis.      L2-L3: Minimal disc bulge and small focal left foraminal protrusion. Facets  unremarkable. Spinal canal patent. Mild left foraminal stenosis.      L3-L4: No significant disc disease. Mild facet arthropathy. Spinal canal patent. Mild bilateral foraminal stenosis.      L4-L5: Minimal disc bulge. Mild facet arthropathy. Spinal canal patent. Mild  bilateral foraminal stenosis.      L5-S1: Focal central disc protrusion extruding roughly 1.0 cm posteriorly, mild  facet arthropathy. Abutment of descending bilateral S1 nerve roots and possibly  additional descending nerve roots, with overall mild spinal canal stenosis. Moderate bilateral foraminal stenosis.     Imaged sacrum: Unremarkable.      IMPRESSION  IMPRESSION:          1. At L5-S1 there is pronounced focal central disc protrusion abutting bilateral  descending S1 nerve roots and possibly additional descending nerve roots, with  overall mild spinal canal stenosis. Moderate bilateral foraminal stenosis also.      -Minimal degenerative changes at other levels, causing no more than mild  foraminal stenosis.      reviewed      Mr. Vannesa Loera has a reminder for a \"due or due soon\" health maintenance.  I have asked that he contact his primary care provider for follow-up on this health maintenance.       This plan was reviewed with the patient and patient agrees. All questions were answered. More than half of this visit today was spent on counseling.      Written by Sandy Kern, as dictated by Dr. Meenakshi Bedolla, Dr. Burch Parents, confirm that all documentation is accurate.

## 2017-05-16 NOTE — MR AVS SNAPSHOT
Visit Information Date & Time Provider Department Dept. Phone Encounter #  
 5/16/2017  3:30 PM Wing Jerad MD [facility] [phone] [id number] Follow-up Instructions Return if symptoms worsen or fail to improve. Upcoming Health Maintenance Date Due DTaP/Tdap/Td series (1 - Tdap) 10/26/2006 INFLUENZA AGE 9 TO ADULT 8/1/2017 Allergies as of 5/16/2017  Review Complete On: 5/16/2017 By: Naomy Wells LPN No Known Allergies Current Immunizations  Never Reviewed No immunizations on file. Not reviewed this visit You Were Diagnosed With   
  
 Codes Comments Lumbar herniated disc    -  Primary ICD-10-CM: M51.26 
ICD-9-CM: 722.10 Lumbar radiculopathy     ICD-10-CM: M54.16 
ICD-9-CM: 724.4 Vitals BP Pulse Resp Height(growth percentile) Weight(growth percentile) BMI  
 131/70 84 14 6' (1.829 m) 175 lb (79.4 kg) 23.73 kg/m2 Smoking Status Former Smoker BMI and BSA Data Body Mass Index Body Surface Area  
 23.73 kg/m 2 2.01 m 2 Preferred Pharmacy Pharmacy Name Phone [id number] [place], [address] [facility] [phone] Your Updated Medication List  
  
   
This list is accurate as of: 5/16/17  4:14 PM.  Always use your most recent med list.  
  
  
  
  
 cyclobenzaprine 5 mg tablet Commonly known as:  FLEXERIL Take 1 Tab by mouth three (3) times daily as needed for Muscle Spasm(s). gabapentin 300 mg capsule Commonly known as:  NEURONTIN Take 2 Caps by mouth three (3) times daily. indomethacin 25 mg capsule Commonly known as:  INDOCIN Take 50 mg by mouth three (3) times daily. Pt unsure of dose. traMADol 50 mg tablet Commonly known as:  ULTRAM  
Take 1 Tab by mouth two (2) times daily as needed for Pain. Max Daily Amount: 100 mg. ZYRTEC PO Take  by mouth. Prescriptions Printed Refills  
 traMADol (ULTRAM) 50 mg tablet 0 Sig: Take 1 Tab by mouth two (2) times daily as needed for Pain. Max Daily Amount: 100 mg. Class: Print Route: Oral  
  
We Performed the Following REFERRAL TO SPINE SURGERY [[id number] Custom] Comments: MRI 01/25/2017 Follow-up Instructions Return if symptoms worsen or fail to improve. Referral Information Referral ID Referred By Referred To  
  
 [id number] Conor Frost MD   
   [address] [facility] [address] [facility], [address] Phone: [phone] Fax: [phone] Visits Status Start Date End Date 1 New Request 5/16/17 5/16/18 If your referral has a status of pending review or denied, additional information will be sent to support the outcome of this decision. Please provide this summary of care documentation to your next provider. Your primary care clinician is listed as Nitin Sosa. If you have any questions after today's visit, please call [phone].

## 2017-06-01 ENCOUNTER — OFFICE VISIT (OUTPATIENT)
Dept: ORTHOPEDIC SURGERY | Age: 32
End: 2017-06-01

## 2017-06-01 VITALS
SYSTOLIC BLOOD PRESSURE: 102 MMHG | HEIGHT: 72 IN | RESPIRATION RATE: 18 BRPM | WEIGHT: 186.4 LBS | HEART RATE: 109 BPM | DIASTOLIC BLOOD PRESSURE: 65 MMHG | BODY MASS INDEX: 25.25 KG/M2

## 2017-06-01 DIAGNOSIS — M48.061 LUMBAR SPINAL STENOSIS: ICD-10-CM

## 2017-06-01 DIAGNOSIS — M45.6 ANKYLOSING SPONDYLITIS OF LUMBAR REGION (HCC): ICD-10-CM

## 2017-06-01 DIAGNOSIS — M51.26 LUMBAR HERNIATED DISC: Primary | ICD-10-CM

## 2017-06-01 DIAGNOSIS — M54.16 LUMBAR RADICULOPATHY: ICD-10-CM

## 2017-06-01 RX ORDER — TOPIRAMATE 25 MG/1
TABLET ORAL
Qty: 90 TAB | Refills: 0 | Status: SHIPPED | OUTPATIENT
Start: 2017-06-01 | End: 2017-06-01

## 2017-06-01 NOTE — PROGRESS NOTES
[address] [facility]   Pre-Surgical Worksheet    Patient: Juju Zuñiga                         MRN: [id number]     Age:  32 y.o.,      Sex: male    YOB: 1985           MATTHIEU: June 1, 2017  PCP: Jose Diehl MD    No Known Allergies      ICD-10-CM ICD-9-CM    1. Lumbar herniated disc M51.26 722.10    2. Lumbar spinal stenosis M48.06 724.02    3. Lumbar radiculopathy M54.16 724.4    4. Ankylosing spondylitis of lumbar region ([facility] M45.6 720.0        Surgery: LEFT L5 LAMI DISC. Pain Assessment   Pain Assessment  6/1/2017   Location of Pain Back;Leg   Location Modifiers Left   Severity of Pain 4   Quality of Pain Other (Comment)   Quality of Pain Comment pulling, numbness, tingling,weakness   Duration of Pain Persistent   Frequency of Pain Constant   Aggravating Factors Walking; Other (Comment)   Aggravating Factors Comment sitting to standing, sneezing and coughing when sitting, prolonged standing   Limiting Behavior Yes   Relieving Factors Other (Comment)   Relieving Factors Comment lying on hard floor, walking upstairs   Result of Injury No       Visit Vitals    /65 (BP 1 Location: Left arm, BP Patient Position: Sitting)    Pulse (!) 109    Resp 18    Ht 6' (1.829 m)    Wt 186 lb 6.4 oz (84.6 kg)    BMI 25.28 kg/m2       ADL Limits: Patient states he is very limited in his daily activities, pain level ranges from 4-8/ 10     Spine Surgery?: No:  When NA. Where NA. Spinal Injections?: yes  When 2017. Where [facility]. Physical Therapy?: Yes  When 2017. Where [facility]. NSAID's?: Yes    Pain Medications?: Yes  Type: Tramadol. In Pain Management: NO, Where: NA    Current Outpatient Prescriptions   Medication Sig    traMADol (ULTRAM) 50 mg tablet Take 1 Tab by mouth two (2) times daily as needed for Pain. Max Daily Amount: 100 mg.    gabapentin (NEURONTIN) 300 mg capsule Take 2 Caps by mouth three (3) times daily.     indomethacin (INDOCIN) 25 mg capsule Take 50 mg by mouth three (3) times daily. Pt unsure of dose.  CETIRIZINE HCL (ZYRTEC PO) Take  by mouth.  cyclobenzaprine (FLEXERIL) 5 mg tablet Take 1 Tab by mouth three (3) times daily as needed for Muscle Spasm(s). No current facility-administered medications for this visit.         Past Medical History:   Diagnosis Date    Asthma     Spondylitis ([facility] [address]        Past Surgical History:   Procedure Laterality Date    HX TONSIL AND ADENOIDECTOMY      NEUROLOGICAL PROCEDURE UNLISTED  02/28/2017    block       Social History     Social History    Marital status: SINGLE     Spouse name: N/A    Number of children: N/A    Years of education: N/A     Social History Main Topics    Smoking status: Former Smoker    Smokeless tobacco: Never Used    Alcohol use Yes      Comment: 6-7 drinks per week    Drug use: No    Sexual activity: Not Asked     Other Topics Concern    None     Social History Narrative

## 2017-06-01 NOTE — MR AVS SNAPSHOT
Visit Information Date & Time Provider Department Dept. Phone Encounter #  
 6/1/2017  3:15 PM Kal Bourne MD [facility] [phone] [id number] Upcoming Health Maintenance Date Due DTaP/Tdap/Td series (1 - Tdap) 10/26/2006 INFLUENZA AGE 9 TO ADULT 8/1/2017 Allergies as of 6/1/2017  Review Complete On: 6/1/2017 By: Kal Bourne MD  
 No Known Allergies Current Immunizations  Never Reviewed No immunizations on file. Not reviewed this visit You Were Diagnosed With   
  
 Codes Comments Lumbar herniated disc    -  Primary ICD-10-CM: M51.26 
ICD-9-CM: 722.10 Lumbar spinal stenosis     ICD-10-CM: M48.06 
ICD-9-CM: 724.02 Lumbar radiculopathy     ICD-10-CM: M54.16 
ICD-9-CM: 724.4 Ankylosing spondylitis of lumbar region [facility])     ICD-10-CM: M45.6 ICD-9-CM: 720.0 Vitals BP Pulse Resp Height(growth percentile) Weight(growth percentile) BMI  
 102/65 (BP 1 Location: Left arm, BP Patient Position: Sitting) (!) 109 18 6' (1.829 m) 186 lb 6.4 oz (84.6 kg) 25.28 kg/m2 Smoking Status Former Smoker BMI and BSA Data Body Mass Index Body Surface Area  
 25.28 kg/m 2 2.07 m 2 Preferred Pharmacy Pharmacy Name Phone [id number] [address] [facility] [phone] Your Updated Medication List  
  
   
This list is accurate as of: 6/1/17  4:47 PM.  Always use your most recent med list.  
  
  
  
  
 cyclobenzaprine 5 mg tablet Commonly known as:  FLEXERIL Take 1 Tab by mouth three (3) times daily as needed for Muscle Spasm(s). gabapentin 300 mg capsule Commonly known as:  NEURONTIN Take 2 Caps by mouth three (3) times daily. indomethacin 25 mg capsule Commonly known as:  INDOCIN Take 50 mg by mouth three (3) times daily. Pt unsure of dose. traMADol 50 mg tablet Commonly known as:  Ervin Rodriguez  
 Take 1 Tab by mouth two (2) times daily as needed for Pain. Max Daily Amount: 100 mg. ZYRTEC PO Take  by mouth. Please provide this summary of care documentation to your next provider. Your primary care clinician is listed as Nitin Sosa. If you have any questions after today's visit, please call [phone].

## 2017-06-01 NOTE — PROGRESS NOTES
[facility]  [address] [facility] [address]  [place], [facility] [address]  Phone: [phone]  Fax: [phone]  INITIAL CONSULTATION  Patient: Zeenat Helms                MRN: [id number]       SSN: [id number]  YOB: 1985        AGE: 32 y.o. SEX: male  There is no height or weight on file to calculate BMI. PCP: Moy Gatica MD  06/01/17    No chief complaint on file. HISTORY OF PRESENT ILLNESS, RADIOGRAPHS, and PLAN:         HISTORY OF PRESENT ILLNESS:  Mr. Cj Crockett is seen today at the request of Dr. Moy Gatica and Dr. Bonifacio Jeans. Mr. Cj Crockett is a 26-year-old male with a history of ankylosing spondylitis who has had an eight month history of left-sided sciatica. It has been unresponsive to conservative treatments. PHYSICAL EXAMINATION:  His physical exam is significant for dramatically positive straight leg raise, a bad gait with a limp, and numbness and tingling in his left leg. He has been through selective injections, exercise programs, and medications, all without improvement. He is having a hard time functioning at work. RADIOGRAPHS:  MRI demonstrates a large, left paracentral disc herniation at L5-S1.      ASSESSMENT/PLAN: I reviewed his pathology with him. We went over the natural history of his disc. It has been at least eight months of significant symptomatology with nerve tension signs and severe pain. At this point, it would either be repeating the conservative steps he has already had and in essence failed or considering surgical intervention, which would be a limited left L5-S1 laminotomy discectomy. The concerning issues are his ankylosing spondylitis and just the ongoing nature of the large disc herniation. We discussed the risks, benefits, complications, and alternatives to surgery. He wishes to proceed.   We will proceed with a left L5-S1 laminotomy once the appropriate approvals and clearances take place.     cc: Elisa Brennan M.D. Past Medical History:   Diagnosis Date    Asthma     Spondylitis ([facility]        Family History   Problem Relation Age of Onset    Diabetes Mother     No Known Problems Father        Current Outpatient Prescriptions   Medication Sig Dispense Refill    traMADol (ULTRAM) 50 mg tablet Take 1 Tab by mouth two (2) times daily as needed for Pain. Max Daily Amount: 100 mg. 60 Tab 0    gabapentin (NEURONTIN) 300 mg capsule Take 2 Caps by mouth three (3) times daily. 180 Cap 2    indomethacin (INDOCIN) 25 mg capsule Take 50 mg by mouth three (3) times daily. Pt unsure of dose.  CETIRIZINE HCL (ZYRTEC PO) Take  by mouth.  cyclobenzaprine (FLEXERIL) 5 mg tablet Take 1 Tab by mouth three (3) times daily as needed for Muscle Spasm(s). 15 Tab 0       No Known Allergies    Past Surgical History:   Procedure Laterality Date    HX TONSIL AND ADENOIDECTOMY      NEUROLOGICAL PROCEDURE UNLISTED  02/28/2017    block       Past Medical History:   Diagnosis Date    Asthma     Spondylitis ([facility]        Social History     Social History    Marital status: SINGLE     Spouse name: N/A    Number of children: N/A    Years of education: N/A     Occupational History    Not on file. Social History Main Topics    Smoking status: Former Smoker    Smokeless tobacco: Never Used    Alcohol use Yes      Comment: 6-7 drinks per week    Drug use: No    Sexual activity: Not on file     Other Topics Concern    Not on file     Social History Narrative       WORK STATUS: Employed. REVIEW OF SYSTEMS:   CONSTITUTIONAL SYMPTOMS:  Negative. EYES:  Negative. EARS, NOSE, THROAT AND MOUTH:  Negative. CARDIOVASCULAR:  Negative. RESPIRATORY:  Negative. GENITOURINARY: Negative. GASTROINTESTINAL:  Negative. INTEGUMENTARY (SKIN AND/OR BREAST):  Negative. MUSCULOSKELETAL: Per HPI.   ENDOCRINE/RHEUMATOLOGIC:  Negative. NEUROLOGICAL:  Per HPI.    HEMATOLOGIC/LYMPHATIC: Negative. ALLERGIC/IMMUNOLOGIC:  Negative. PSYCHIATRIC:  Negative. PHYSICAL EXAMINATION:   Visit Vitals    /65 (BP 1 Location: Left arm, BP Patient Position: Sitting)    Pulse (!) 109    Resp 18    Ht 6' (1.829 m)    Wt 186 lb 6.4 oz (84.6 kg)    BMI 25.28 kg/m2   PAIN SCALE:  4-8/10    CONSTITUTIONAL: The patient is in no apparent distress and is alert and oriented x 3. HEENT: Normocephalic. Hearing grossly intact. NECK: Supple and symmetric. no tenderness, or masses were felt. RESPIRATORY: No labored breathing. CARDIOVASCULAR: The carotid pulses were normal. Peripheral pulses were 2+. CHEST: Normal AP diameter and normal contour without any kyphoscoliosis. LYMPHATIC: No lymphadenopathy was appreciated in the neck, axillae or groin. SKIN: Negative for scars, rashes, lesions, or ulcers on the right upper, right lower, left upper, left lower and trunk. NEUROLOGICAL: Alert and oriented x 3. Ambulation without assistive device. FWB. EXTREMITIES:  See musculoskeletal.  MUSCULOSKELETAL:   Head and Neck:  Negative for misalignment, asymmetry, crepitation, defects, tenderness masses or effusions.  Left Upper Extremity: Inspection, percussion and palpation preformed. Stanleys sign is negative.  Right Upper Extremity: Inspection, percussion and palpation preformed. Stanleys sign is negative.  Spine, Ribs and Pelvis: Chronic low back pain. Inspection, percussion and palpation preformed. Negative for misalignment, asymmetry, crepitation, defects, tenderness masses or effusions.  Left Lower Extremity: Pain extending in an L5/S1 distribution. Paraesthesia of the calf and foot. Weakness sensation. Inspection, percussion and palpation preformed. Negative straight leg raise.  Right Lower Extremity: Inspection, percussion and palpation preformed. Negative straight leg raise. SPINE EXAM:     Lumbar spine: No rash, ecchymosis, or gross obliquity. No focal atrophy is noted. ASSESSMENT    ICD-10-CM ICD-9-CM    1. Lumbar herniated disc M51.26 722.10    2. Lumbar spinal stenosis M48.06 724.02    3. Lumbar radiculopathy M54.16 724.4    4. Ankylosing spondylitis of lumbar region [facility]) M45.6 720.0        Written by Parul Isaac, as dictated by Adria Herbert MD.    I, Dr. Adria Herbert MD, confirm that all documentation is accurate.

## 2017-06-03 DIAGNOSIS — M51.26 LUMBAR HERNIATED DISC: ICD-10-CM

## 2017-06-15 NOTE — PROGRESS NOTES
[facility]   [address]  [place], [address]  [phone] fax    Physical Therapy Discharge Summary  Patient name: Juliana Ernst Start of Care: 17   Referral source: Barrington Suarez MD : 1985   Medical/Treatment Diagnosis: Radiculopathy, lumbar region [M54.16] Onset Date:worsening 10/2016     Prior Hospitalization: see medical history Provider#: [id number]   Medications: Verified on Patient Summary List    Comorbidities: arthritis, asthma, ankylosing spondylitis (per pt report)  Prior Level of Function: Independent with ADLs, functional and work tasks with intermittent mild back pain before the flare up in 10/2016  Visits from Start of Care: 4    Missed Visits: 3  Reporting Period : 17 to 17      Summary of Care:  Short Term Goals: To be accomplished in 1 weeks:  1. Pt will report compliance and independence to [facility] to help the pt manage their pain and symptoms. -progressing per patient report. 17  Long Term Goals: To be accomplished in 4 weeks:  1. Pt will increase FOTO score to 66 points to improve ability to perform ADLs. 2. Pt will be able to walk with mild to no limp on the left LE after performing a sit to stand to improve ease of performing functional tasks. 3. Pt will increase AROM l/s flex to % of WNL with mild to no increased pain in the LEs to improve ability to perform work tasks. 4. Pt will report being able roll over in bed with mild to no increased pain to help the pt perform bed mobility with less pain. Patient has not returned to PT since 17; unplanned D/C.      ASSESSMENT/RECOMMENDATIONS:  [x]Discontinue therapy: []Patient has reached or is progressing toward set goals      [x]Patient is non-compliant or has abdicated      []Due to lack of appreciable progress towards set Ul. Jeffrey Stewart, PT 6/15/2017 11:45 AM

## 2019-07-22 ENCOUNTER — HOSPITAL ENCOUNTER (OUTPATIENT)
Dept: GENERAL RADIOLOGY | Age: 34
Discharge: HOME | End: 2019-07-22
Payer: COMMERCIAL

## 2019-07-22 DIAGNOSIS — R50.9 HYPERTHERMIA-INDUCED DEFECT: ICD-10-CM

## 2019-07-22 PROCEDURE — 71046 X-RAY EXAM CHEST 2 VIEWS: CPT

## 2021-04-07 ENCOUNTER — TRANSCRIBE ORDER (OUTPATIENT)
Dept: SCHEDULING | Age: 36
End: 2021-04-07

## 2021-04-07 DIAGNOSIS — M24.60: Primary | ICD-10-CM

## 2021-04-28 ENCOUNTER — HOSPITAL ENCOUNTER (OUTPATIENT)
Age: 36
Discharge: HOME | End: 2021-04-28
Attending: ORTHOPAEDIC SURGERY
Payer: COMMERCIAL

## 2021-04-28 DIAGNOSIS — M24.60: ICD-10-CM

## 2021-04-28 PROCEDURE — 72148 MRI LUMBAR SPINE W/O DYE: CPT

## 2021-05-03 ENCOUNTER — HOSPITAL ENCOUNTER (OUTPATIENT)
Dept: GENERAL RADIOLOGY | Age: 36
Discharge: HOME | End: 2021-05-03
Payer: COMMERCIAL

## 2021-05-03 DIAGNOSIS — M24.60 ANKYLOSIS: ICD-10-CM

## 2021-05-03 DIAGNOSIS — M54.50 LOW BACK PAIN: ICD-10-CM

## 2021-05-03 DIAGNOSIS — M46.90: ICD-10-CM

## 2021-05-03 PROCEDURE — 72114 X-RAY EXAM L-S SPINE BENDING: CPT

## (undated) DEVICE — MEDIA CONTRAST 10ML 200MG/ML 41%

## (undated) DEVICE — AVANOS* SHORT BEVEL NEEDLE: Brand: AVANOS

## (undated) DEVICE — TRAY MYEL SFTY +

## (undated) DEVICE — (D)SYR 10ML 1/5ML GRAD NSAF -- PKGING CHANGE USE ITEM 338027

## (undated) DEVICE — (D)NDL SPNE 22GX15CM -- DISC BY MFR W/NO SUB

## (undated) DEVICE — (D)BNDG ADHESIVE FABRIC 3/4X3 -- DISC BY MFR USE ITEM 357960

## (undated) DEVICE — NEEDLE EPI 18GA L3.5IN CLR HUB TUOHY W/ WNG PERIFIX